# Patient Record
Sex: FEMALE | Race: WHITE | NOT HISPANIC OR LATINO | Employment: FULL TIME | URBAN - METROPOLITAN AREA
[De-identification: names, ages, dates, MRNs, and addresses within clinical notes are randomized per-mention and may not be internally consistent; named-entity substitution may affect disease eponyms.]

---

## 2017-01-09 ENCOUNTER — TRANSCRIBE ORDERS (OUTPATIENT)
Dept: ADMINISTRATIVE | Facility: HOSPITAL | Age: 47
End: 2017-01-09

## 2017-01-09 DIAGNOSIS — Z12.31 SCREENING MAMMOGRAM FOR HIGH-RISK PATIENT: Primary | ICD-10-CM

## 2017-02-06 ENCOUNTER — HOSPITAL ENCOUNTER (OUTPATIENT)
Dept: MAMMOGRAPHY | Facility: HOSPITAL | Age: 47
Discharge: HOME | End: 2017-02-06
Payer: COMMERCIAL

## 2017-02-06 DIAGNOSIS — Z12.31 SCREENING MAMMOGRAM FOR HIGH-RISK PATIENT: ICD-10-CM

## 2017-02-06 PROCEDURE — G0202 SCR MAMMO BI INCL CAD: HCPCS

## 2018-01-29 ENCOUNTER — TRANSCRIBE ORDERS (OUTPATIENT)
Dept: ADMINISTRATIVE | Facility: HOSPITAL | Age: 48
End: 2018-01-29

## 2018-01-29 DIAGNOSIS — Z12.39 SCREENING BREAST EXAMINATION: Primary | ICD-10-CM

## 2018-03-19 ENCOUNTER — TRANSCRIBE ORDERS (OUTPATIENT)
Dept: ADMINISTRATIVE | Facility: HOSPITAL | Age: 48
End: 2018-03-19

## 2018-03-19 ENCOUNTER — HOSPITAL ENCOUNTER (OUTPATIENT)
Dept: MAMMOGRAPHY | Facility: HOSPITAL | Age: 48
Discharge: HOME | End: 2018-03-19
Payer: COMMERCIAL

## 2018-03-19 DIAGNOSIS — Z12.39 SCREENING BREAST EXAMINATION: ICD-10-CM

## 2018-03-19 PROCEDURE — 77067 SCR MAMMO BI INCL CAD: CPT

## 2018-03-26 ENCOUNTER — HOSPITAL ENCOUNTER (OUTPATIENT)
Dept: RADIOLOGY | Facility: HOSPITAL | Age: 48
Discharge: HOME | End: 2018-03-26
Payer: COMMERCIAL

## 2018-03-26 DIAGNOSIS — R92.8 ABNORMAL SCREENING MAMMOGRAM: ICD-10-CM

## 2018-03-26 PROCEDURE — 76642 ULTRASOUND BREAST LIMITED: CPT

## 2018-04-05 ENCOUNTER — HOSPITAL ENCOUNTER (OUTPATIENT)
Dept: RADIOLOGY | Facility: HOSPITAL | Age: 48
Discharge: HOME | End: 2018-04-05
Payer: COMMERCIAL

## 2018-04-05 ENCOUNTER — HOSPITAL ENCOUNTER (OUTPATIENT)
Dept: RADIOLOGY | Facility: HOSPITAL | Age: 48
Discharge: HOME | End: 2018-04-05

## 2018-04-05 ENCOUNTER — HOSPITAL ENCOUNTER (OUTPATIENT)
Dept: RADIOLOGY | Facility: HOSPITAL | Age: 48
Discharge: HOME | End: 2018-04-05
Admitting: RADIOLOGY
Payer: COMMERCIAL

## 2018-04-05 VITALS
HEART RATE: 62 BPM | RESPIRATION RATE: 16 BRPM | SYSTOLIC BLOOD PRESSURE: 103 MMHG | DIASTOLIC BLOOD PRESSURE: 63 MMHG | OXYGEN SATURATION: 100 %

## 2018-04-05 DIAGNOSIS — N63.10 BREAST MASS, RIGHT: ICD-10-CM

## 2018-04-05 PROCEDURE — 88305 TISSUE EXAM BY PATHOLOGIST: CPT | Performed by: PATHOLOGY

## 2018-04-05 PROCEDURE — 19083 BX BREAST 1ST LESION US IMAG: CPT

## 2019-10-15 ENCOUNTER — TRANSCRIBE ORDERS (OUTPATIENT)
Dept: ADMINISTRATIVE | Facility: HOSPITAL | Age: 49
End: 2019-10-15

## 2019-10-15 DIAGNOSIS — Z12.31 ENCOUNTER FOR SCREENING MAMMOGRAM FOR MALIGNANT NEOPLASM OF BREAST: Primary | ICD-10-CM

## 2019-11-09 ENCOUNTER — HOSPITAL ENCOUNTER (OUTPATIENT)
Dept: RADIOLOGY | Facility: HOSPITAL | Age: 49
Discharge: HOME | End: 2019-11-09
Payer: COMMERCIAL

## 2019-11-09 VITALS — WEIGHT: 125 LBS | HEIGHT: 65 IN | BODY MASS INDEX: 20.83 KG/M2

## 2019-11-09 DIAGNOSIS — Z12.31 ENCOUNTER FOR SCREENING MAMMOGRAM FOR MALIGNANT NEOPLASM OF BREAST: ICD-10-CM

## 2019-11-09 PROCEDURE — 77063 BREAST TOMOSYNTHESIS BI: CPT

## 2019-11-09 PROCEDURE — 77067 SCR MAMMO BI INCL CAD: CPT

## 2020-10-19 ENCOUNTER — TELEPHONE (OUTPATIENT)
Dept: FAMILY MEDICINE CLINIC | Facility: CLINIC | Age: 50
End: 2020-10-19

## 2020-11-02 ENCOUNTER — OFFICE VISIT (OUTPATIENT)
Dept: OBGYN CLINIC | Facility: CLINIC | Age: 50
End: 2020-11-02
Payer: COMMERCIAL

## 2020-11-02 VITALS
DIASTOLIC BLOOD PRESSURE: 62 MMHG | WEIGHT: 129 LBS | TEMPERATURE: 98 F | BODY MASS INDEX: 21.49 KG/M2 | HEIGHT: 65 IN | SYSTOLIC BLOOD PRESSURE: 114 MMHG

## 2020-11-02 DIAGNOSIS — Z11.51 SCREENING FOR HUMAN PAPILLOMAVIRUS: ICD-10-CM

## 2020-11-02 DIAGNOSIS — Z01.419 ROUTINE GYNECOLOGICAL EXAMINATION: Primary | ICD-10-CM

## 2020-11-02 DIAGNOSIS — R92.2 BREAST DENSITY: ICD-10-CM

## 2020-11-02 DIAGNOSIS — N64.4 BREAST PAIN: ICD-10-CM

## 2020-11-02 PROCEDURE — 87624 HPV HI-RISK TYP POOLED RSLT: CPT | Performed by: OBSTETRICS & GYNECOLOGY

## 2020-11-02 PROCEDURE — G0145 SCR C/V CYTO,THINLAYER,RESCR: HCPCS | Performed by: OBSTETRICS & GYNECOLOGY

## 2020-11-02 PROCEDURE — S0610 ANNUAL GYNECOLOGICAL EXAMINA: HCPCS | Performed by: OBSTETRICS & GYNECOLOGY

## 2020-11-04 LAB
HPV HR 12 DNA CVX QL NAA+PROBE: NEGATIVE
HPV16 DNA CVX QL NAA+PROBE: NEGATIVE
HPV18 DNA CVX QL NAA+PROBE: NEGATIVE

## 2020-11-09 LAB
LAB AP GYN PRIMARY INTERPRETATION: NORMAL
LAB AP LMP: NORMAL
Lab: NORMAL

## 2020-12-09 ENCOUNTER — HOSPITAL ENCOUNTER (OUTPATIENT)
Dept: RADIOLOGY | Facility: HOSPITAL | Age: 50
Discharge: HOME | End: 2020-12-09
Attending: OBSTETRICS & GYNECOLOGY
Payer: COMMERCIAL

## 2020-12-09 VITALS — HEIGHT: 65 IN | BODY MASS INDEX: 20.83 KG/M2 | WEIGHT: 125 LBS

## 2020-12-09 DIAGNOSIS — R92.2 BREAST DENSITY: ICD-10-CM

## 2020-12-09 DIAGNOSIS — N64.4 BREAST PAIN: ICD-10-CM

## 2020-12-09 PROCEDURE — G0279 TOMOSYNTHESIS, MAMMO: HCPCS

## 2020-12-09 PROCEDURE — 76642 ULTRASOUND BREAST LIMITED: CPT

## 2020-12-09 PROCEDURE — 77066 DX MAMMO INCL CAD BI: CPT

## 2020-12-14 DIAGNOSIS — N60.01 BENIGN BREAST CYST IN FEMALE, RIGHT: Primary | ICD-10-CM

## 2021-11-12 ENCOUNTER — OFFICE VISIT (OUTPATIENT)
Dept: FAMILY MEDICINE CLINIC | Facility: CLINIC | Age: 51
End: 2021-11-12
Payer: COMMERCIAL

## 2021-11-12 ENCOUNTER — NEW REFERRAL (OUTPATIENT)
Dept: URBAN - METROPOLITAN AREA CLINIC 6 | Facility: CLINIC | Age: 51
End: 2021-11-12

## 2021-11-12 VITALS
HEART RATE: 90 BPM | HEIGHT: 65 IN | DIASTOLIC BLOOD PRESSURE: 80 MMHG | RESPIRATION RATE: 14 BRPM | OXYGEN SATURATION: 99 % | WEIGHT: 130.8 LBS | TEMPERATURE: 97.1 F | BODY MASS INDEX: 21.79 KG/M2 | SYSTOLIC BLOOD PRESSURE: 110 MMHG

## 2021-11-12 DIAGNOSIS — H57.12 ACUTE PAIN IN LEFT EYE: Primary | ICD-10-CM

## 2021-11-12 DIAGNOSIS — H57.89 EYE REDNESS: ICD-10-CM

## 2021-11-12 DIAGNOSIS — H57.89: ICD-10-CM

## 2021-11-12 PROCEDURE — G8427 DOCREV CUR MEDS BY ELIG CLIN: HCPCS

## 2021-11-12 PROCEDURE — 3725F SCREEN DEPRESSION PERFORMED: CPT | Performed by: FAMILY MEDICINE

## 2021-11-12 PROCEDURE — 1036F TOBACCO NON-USER: CPT | Performed by: FAMILY MEDICINE

## 2021-11-12 PROCEDURE — 99213 OFFICE O/P EST LOW 20 MIN: CPT | Performed by: FAMILY MEDICINE

## 2021-11-12 PROCEDURE — 1036F TOBACCO NON-USER: CPT

## 2021-11-12 PROCEDURE — 99243 OFF/OP CNSLTJ NEW/EST LOW 30: CPT

## 2021-11-12 PROCEDURE — 3008F BODY MASS INDEX DOCD: CPT | Performed by: FAMILY MEDICINE

## 2021-11-12 ASSESSMENT — VISUAL ACUITY
OD_CC: 20/25
OS_CC: 20/25

## 2021-11-12 ASSESSMENT — TONOMETRY
OS_IOP_MMHG: 12
OD_IOP_MMHG: 14

## 2022-01-03 ENCOUNTER — ANNUAL EXAM (OUTPATIENT)
Dept: OBGYN CLINIC | Facility: CLINIC | Age: 52
End: 2022-01-03
Payer: COMMERCIAL

## 2022-01-03 ENCOUNTER — HOSPITAL ENCOUNTER (OUTPATIENT)
Dept: RADIOLOGY | Facility: HOSPITAL | Age: 52
Discharge: HOME | End: 2022-01-03
Attending: OBSTETRICS & GYNECOLOGY
Payer: COMMERCIAL

## 2022-01-03 VITALS
WEIGHT: 131.2 LBS | BODY MASS INDEX: 21.86 KG/M2 | HEIGHT: 65 IN | SYSTOLIC BLOOD PRESSURE: 114 MMHG | DIASTOLIC BLOOD PRESSURE: 70 MMHG

## 2022-01-03 VITALS — WEIGHT: 125 LBS | BODY MASS INDEX: 20.83 KG/M2 | HEIGHT: 65 IN

## 2022-01-03 DIAGNOSIS — Z12.31 ENCOUNTER FOR SCREENING MAMMOGRAM FOR MALIGNANT NEOPLASM OF BREAST: ICD-10-CM

## 2022-01-03 DIAGNOSIS — Z01.419 ENCOUNTER FOR GYNECOLOGICAL EXAMINATION WITHOUT ABNORMAL FINDING: Primary | ICD-10-CM

## 2022-01-03 PROCEDURE — G0145 SCR C/V CYTO,THINLAYER,RESCR: HCPCS | Performed by: PHYSICIAN ASSISTANT

## 2022-01-03 PROCEDURE — G0476 HPV COMBO ASSAY CA SCREEN: HCPCS | Performed by: PHYSICIAN ASSISTANT

## 2022-01-03 PROCEDURE — 77067 SCR MAMMO BI INCL CAD: CPT

## 2022-01-03 PROCEDURE — S0612 ANNUAL GYNECOLOGICAL EXAMINA: HCPCS | Performed by: PHYSICIAN ASSISTANT

## 2022-01-03 PROCEDURE — 77063 BREAST TOMOSYNTHESIS BI: CPT

## 2022-01-03 NOTE — PROGRESS NOTES
Assessment/Plan:    No problem-specific Assessment & Plan notes found for this encounter  Diagnoses and all orders for this visit:    Encounter for gynecological examination without abnormal finding  -     Liquid-based pap, screening        Pap done  Call if periods worsen or change  If no problems, patient to return in 1 year for routine gyn care  Subjective:      Patient ID: Helmut Lanes is a 54 y o  female  Patient is here for yearly gyn exam   States she is doing well overall  Periods are still mostly regular once a month; skips an occasional cycle  Bleeding lasts for 5-6 days  She denies heavy bleeding, severe cramping, HA, and mood symptoms  Patient also denies bowel/bladder changes, pelvic pain, bloating, abdominal pain, n/v, change in appetite, and thyroid disease  Patient had mammogram this morning  She is performing self-breast exam   Denies new masses, skin changes, nipple discharge, and pain/tenderness  The following portions of the patient's history were reviewed and updated as appropriate: allergies, current medications, past family history, past medical history, past social history, past surgical history and problem list     Review of Systems   Constitutional: Negative for appetite change and unexpected weight change  Cardiovascular:        No masses, skin changes, nipple discharge, and pain/tenderness  Gastrointestinal: Negative for abdominal distention, abdominal pain, constipation, diarrhea, nausea and vomiting  Genitourinary: Negative for difficulty urinating, dysuria, frequency, genital sores, hematuria, menstrual problem, pelvic pain, urgency, vaginal bleeding, vaginal discharge and vaginal pain  Objective:      /70 (BP Location: Left arm, Patient Position: Sitting, Cuff Size: Standard)   Ht 5' 5" (1 651 m)   Wt 59 5 kg (131 lb 3 2 oz)   LMP 12/16/2021 (LMP Unknown)   BMI 21 83 kg/m²          Physical Exam  Vitals reviewed   Exam conducted with a chaperone present  Constitutional:       Appearance: Normal appearance  She is well-developed and normal weight  Neck:      Thyroid: No thyromegaly  Pulmonary:      Effort: Pulmonary effort is normal    Chest:   Breasts: Breasts are symmetrical       Right: Normal  No swelling, bleeding, inverted nipple, mass, nipple discharge, skin change, tenderness, axillary adenopathy or supraclavicular adenopathy  Left: Normal  No swelling, bleeding, inverted nipple, mass, nipple discharge, skin change, tenderness, axillary adenopathy or supraclavicular adenopathy  Abdominal:      General: Abdomen is flat  There is no distension  Palpations: Abdomen is soft  Tenderness: There is no abdominal tenderness  Genitourinary:     General: Normal vulva  Pubic Area: No rash  Labia:         Right: No rash, tenderness, lesion or injury  Left: No rash, tenderness, lesion or injury  Vagina: Normal  No vaginal discharge, erythema, tenderness or bleeding  Cervix: Normal       Uterus: Normal        Adnexa: Right adnexa normal and left adnexa normal         Right: No mass, tenderness or fullness  Left: No mass, tenderness or fullness  Musculoskeletal:      Cervical back: Neck supple  Lymphadenopathy:      Cervical: No cervical adenopathy  Upper Body:      Right upper body: No supraclavicular or axillary adenopathy  Left upper body: No supraclavicular or axillary adenopathy  Lower Body: No right inguinal adenopathy  No left inguinal adenopathy  Skin:     General: Skin is warm and dry  Neurological:      Mental Status: She is alert and oriented to person, place, and time  Psychiatric:         Mood and Affect: Mood normal          Behavior: Behavior normal  Behavior is cooperative  Thought Content:  Thought content normal          Judgment: Judgment normal

## 2022-01-11 LAB
LAB AP GYN PRIMARY INTERPRETATION: NORMAL
Lab: NORMAL

## 2022-03-16 ENCOUNTER — RA CDI HCC (OUTPATIENT)
Dept: OTHER | Facility: HOSPITAL | Age: 52
End: 2022-03-16

## 2022-03-16 NOTE — PROGRESS NOTES
[facility]  coding opportunities       Chart reviewed, no opportunity found: CHART REVIEWED, NO OPPORTUNITY FOUND        Patients Insurance        Commercial Insurance: [address] [place] [address]

## 2022-03-21 ENCOUNTER — OFFICE VISIT (OUTPATIENT)
Dept: FAMILY MEDICINE CLINIC | Facility: CLINIC | Age: 52
End: 2022-03-21
Payer: COMMERCIAL

## 2022-03-21 VITALS
DIASTOLIC BLOOD PRESSURE: 68 MMHG | HEART RATE: 66 BPM | OXYGEN SATURATION: 98 % | HEIGHT: 65 IN | RESPIRATION RATE: 16 BRPM | SYSTOLIC BLOOD PRESSURE: 100 MMHG | TEMPERATURE: 97.6 F | BODY MASS INDEX: 21.83 KG/M2 | WEIGHT: 131 LBS

## 2022-03-21 DIAGNOSIS — Z11.59 NEED FOR HEPATITIS C SCREENING TEST: ICD-10-CM

## 2022-03-21 DIAGNOSIS — Z23 IMMUNIZATION DUE: ICD-10-CM

## 2022-03-21 DIAGNOSIS — F51.01 PRIMARY INSOMNIA: ICD-10-CM

## 2022-03-21 DIAGNOSIS — Z00.00 ANNUAL PHYSICAL EXAM: Primary | ICD-10-CM

## 2022-03-21 PROCEDURE — 99396 PREV VISIT EST AGE 40-64: CPT | Performed by: FAMILY MEDICINE

## 2022-03-21 PROCEDURE — 90471 IMMUNIZATION ADMIN: CPT | Performed by: FAMILY MEDICINE

## 2022-03-21 PROCEDURE — 3008F BODY MASS INDEX DOCD: CPT | Performed by: FAMILY MEDICINE

## 2022-03-21 PROCEDURE — 90715 TDAP VACCINE 7 YRS/> IM: CPT | Performed by: FAMILY MEDICINE

## 2022-03-21 PROCEDURE — 3725F SCREEN DEPRESSION PERFORMED: CPT | Performed by: FAMILY MEDICINE

## 2022-03-21 PROCEDURE — 1036F TOBACCO NON-USER: CPT | Performed by: FAMILY MEDICINE

## 2022-03-21 NOTE — PROGRESS NOTES
Assessment/Plan:         Problem List Items Addressed This Visit        Other    Annual physical exam - Primary     Check routine labs           Relevant Orders    CBC and differential    Comprehensive metabolic panel    Lipid panel    Urinalysis with microscopic    Ambulatory referral for colonoscopy    Primary insomnia     Declines meds            Other Visit Diagnoses     Need for hepatitis C screening test        Relevant Orders    Hepatitis C Antibody ([facility])            Subjective: pt here for physical sees gyn mammo up to date     Patient ID: Poli Lambert is a 50 y o  female  HPI    The following portions of the patient's history were reviewed and updated as appropriate:   Past Medical History:  She has a past medical history of Breast mass, right and Bulging lumbar disc ,  _______________________________________________________________________  Medical Problems:  does not have any pertinent problems on file ,  _______________________________________________________________________  Past Surgical History:   has a past surgical history that includes US guided breast biopsy right complete (Right, 2018); Breast biopsy (Right); Mammo (historical) (2016); Back surgery; Tonsillectomy;  section; and DILATION AND CURETTAGE OF UTERUS WITH HYSTEROSOCPY  ,  _______________________________________________________________________  Family History:  family history includes Breast cancer in her family; Cancer in her maternal grandmother; Diabetes in her maternal grandfather, maternal grandmother, mother, and paternal grandfather; Heart attack in her father; Heart disease in her father; Hyperlipidemia in her father; Hypertension in her father; Leukemia in her maternal grandmother; No Known Problems in her maternal aunt, paternal aunt, sister, son, and son ,  _______________________________________________________________________  Social History:   reports that she has never smoked   She has never used smokeless tobacco  She reports current alcohol use  She reports that she does not use drugs  ,  _______________________________________________________________________  Allergies:  has No Known Allergies     _______________________________________________________________________  No current outpatient medications on file  No current facility-administered medications for this visit      _______________________________________________________________________  Review of Systems   Constitutional: Negative for appetite change, chills, fatigue and fever  Respiratory: Negative for cough, chest tightness and shortness of breath  Cardiovascular: Negative for chest pain, palpitations and leg swelling  Gastrointestinal: Negative for abdominal pain, constipation, diarrhea, nausea and vomiting  Genitourinary: Negative for difficulty urinating and frequency  Musculoskeletal: Negative for arthralgias, back pain and neck pain  Skin: Negative for rash  Neurological: Negative for dizziness, weakness, light-headedness, numbness and headaches  Hematological: Does not bruise/bleed easily  Psychiatric/Behavioral: Positive for sleep disturbance  Negative for dysphoric mood  The patient is not nervous/anxious  Objective:  Vitals:    03/21/22 1040   BP: 100/68   BP Location: Left arm   Patient Position: Sitting   Cuff Size: Standard   Pulse: 66   Resp: 16   Temp: 97 6 °F (36 4 °C)   TempSrc: Temporal   SpO2: 98%   Weight: 59 4 kg (131 lb)   Height: 5' 5" (1 651 m)     Body mass index is 21 8 kg/m²  Physical Exam  Vitals reviewed  Constitutional:       General: She is not in acute distress  Appearance: Normal appearance  She is well-developed  She is not ill-appearing  HENT:      Head: Normocephalic        Right Ear: Tympanic membrane, ear canal and external ear normal       Left Ear: Tympanic membrane, ear canal and external ear normal       Nose: Nose normal       Mouth/Throat: Mouth: Mucous membranes are moist       Pharynx: No oropharyngeal exudate  Eyes:      General: Lids are normal  No scleral icterus  Extraocular Movements: Extraocular movements intact  Conjunctiva/sclera: Conjunctivae normal       Pupils: Pupils are equal, round, and reactive to light  Neck:      Thyroid: No thyromegaly  Vascular: No carotid bruit  Cardiovascular:      Rate and Rhythm: Normal rate and regular rhythm  Pulses: Normal pulses  Heart sounds: Normal heart sounds  No murmur heard  No friction rub  Pulmonary:      Effort: Pulmonary effort is normal       Breath sounds: Normal breath sounds  No wheezing, rhonchi or rales  Abdominal:      General: Bowel sounds are normal  There is no distension  Palpations: Abdomen is soft  There is no mass  Tenderness: There is no abdominal tenderness  There is no guarding  Hernia: No hernia is present  Musculoskeletal:         General: Normal range of motion  Cervical back: Normal range of motion and neck supple  Lymphadenopathy:      Cervical: No cervical adenopathy  Skin:     General: Skin is warm and dry  Findings: No rash  Comments: No abnormal appearing moles   Neurological:      General: No focal deficit present  Mental Status: She is alert and oriented to person, place, and time  Mental status is at baseline  Cranial Nerves: No cranial nerve deficit  Sensory: No sensory deficit  Motor: No weakness, tremor or abnormal muscle tone        Coordination: Coordination normal       Gait: Gait normal       Deep Tendon Reflexes: Reflexes normal    Psychiatric:         Mood and Affect: Mood normal          Speech: Speech normal          Behavior: Behavior normal

## 2022-03-22 LAB
ALBUMIN SERPL-MCNC: 4.5 G/DL (ref 3.6–5.1)
ALBUMIN/GLOB SERPL: 1.6 (CALC) (ref 1–2.5)
ALP SERPL-CCNC: 50 U/L (ref 37–153)
ALT SERPL-CCNC: 14 U/L (ref 6–29)
APPEARANCE UR: CLEAR
AST SERPL-CCNC: 19 U/L (ref 10–35)
BACTERIA UR QL AUTO: NORMAL /HPF
BASOPHILS # BLD AUTO: 58 CELLS/UL (ref 0–200)
BASOPHILS NFR BLD AUTO: 0.9 %
BILIRUB SERPL-MCNC: 0.5 MG/DL (ref 0.2–1.2)
BILIRUB UR QL STRIP: NEGATIVE
BUN SERPL-MCNC: 12 MG/DL (ref 7–25)
BUN/CREAT SERPL: NORMAL (CALC) (ref 6–22)
CALCIUM SERPL-MCNC: 9.8 MG/DL (ref 8.6–10.4)
CHLORIDE SERPL-SCNC: 103 MMOL/L (ref 98–110)
CHOLEST SERPL-MCNC: 207 MG/DL
CHOLEST/HDLC SERPL: 4.1 (CALC)
CO2 SERPL-SCNC: 30 MMOL/L (ref 20–32)
COLOR UR: YELLOW
CREAT SERPL-MCNC: 0.75 MG/DL (ref 0.5–1.05)
EOSINOPHIL # BLD AUTO: 141 CELLS/UL (ref 15–500)
EOSINOPHIL NFR BLD AUTO: 2.2 %
ERYTHROCYTE [DISTWIDTH] IN BLOOD BY AUTOMATED COUNT: 12.8 % (ref 11–15)
GLOBULIN SER CALC-MCNC: 2.9 G/DL (CALC) (ref 1.9–3.7)
GLUCOSE SERPL-MCNC: 80 MG/DL (ref 65–99)
GLUCOSE UR QL STRIP: NEGATIVE
HCT VFR BLD AUTO: 37.9 % (ref 35–45)
HCV AB S/CO SERPL IA: 0.07
HCV AB SERPL QL IA: NORMAL
HDLC SERPL-MCNC: 50 MG/DL
HGB BLD-MCNC: 12.9 G/DL (ref 11.7–15.5)
HGB UR QL STRIP: NEGATIVE
HYALINE CASTS #/AREA URNS LPF: NORMAL /LPF
KETONES UR QL STRIP: NEGATIVE
LDLC SERPL CALC-MCNC: 135 MG/DL (CALC)
LEUKOCYTE ESTERASE UR QL STRIP: NEGATIVE
LYMPHOCYTES # BLD AUTO: 1709 CELLS/UL (ref 850–3900)
LYMPHOCYTES NFR BLD AUTO: 26.7 %
MCH RBC QN AUTO: 31.3 PG (ref 27–33)
MCHC RBC AUTO-ENTMCNC: 34 G/DL (ref 32–36)
MCV RBC AUTO: 92 FL (ref 80–100)
MONOCYTES # BLD AUTO: 410 CELLS/UL (ref 200–950)
MONOCYTES NFR BLD AUTO: 6.4 %
NEUTROPHILS # BLD AUTO: 4083 CELLS/UL (ref 1500–7800)
NEUTROPHILS NFR BLD AUTO: 63.8 %
NITRITE UR QL STRIP: NEGATIVE
NONHDLC SERPL-MCNC: 157 MG/DL (CALC)
PH UR STRIP: 8 [PH] (ref 5–8)
PLATELET # BLD AUTO: 242 THOUSAND/UL (ref 140–400)
PMV BLD REES-ECKER: 12.4 FL (ref 7.5–12.5)
POTASSIUM SERPL-SCNC: 4.2 MMOL/L (ref 3.5–5.3)
PROT SERPL-MCNC: 7.4 G/DL (ref 6.1–8.1)
PROT UR QL STRIP: NEGATIVE
RBC # BLD AUTO: 4.12 MILLION/UL (ref 3.8–5.1)
RBC #/AREA URNS HPF: NORMAL /HPF
SL AMB EGFR AFRICAN AMERICAN: 108 ML/MIN/1.73M2
SL AMB EGFR NON AFRICAN AMERICAN: 93 ML/MIN/1.73M2
SODIUM SERPL-SCNC: 140 MMOL/L (ref 135–146)
SP GR UR STRIP: 1.02 (ref 1–1.03)
SQUAMOUS #/AREA URNS HPF: NORMAL /HPF
TRIGL SERPL-MCNC: 110 MG/DL
WBC # BLD AUTO: 6.4 THOUSAND/UL (ref 3.8–10.8)
WBC #/AREA URNS HPF: NORMAL /HPF

## 2022-03-23 DIAGNOSIS — E78.5 HYPERLIPIDEMIA, UNSPECIFIED HYPERLIPIDEMIA TYPE: Primary | ICD-10-CM

## 2022-05-10 ENCOUNTER — TELEPHONE (OUTPATIENT)
Dept: GASTROENTEROLOGY | Facility: CLINIC | Age: 52
End: 2022-05-10

## 2022-05-10 NOTE — TELEPHONE ENCOUNTER
Patients GI provider: Passed OA    Number to return call: ([phone])    Reason for call: Pt calling returning Ramya's call trying to schedule her colonoscopy  Please call back  Thank you!     Scheduled procedure/appointment date if applicable: Apt/procedure

## 2022-05-16 NOTE — TELEPHONE ENCOUNTER
Spoke to patient and scheduled OA colon with Dr Sanju Griffin @ [facility] on 08/22/2022  Went over prep instructions per protocol and mailed to patient as well  Scheduled date of OA Colon (as of today): 08/22/2022  Physician performing: Dr Sanju Griffin  Location of procedure: [address]  Bowel prep reviewed with patient: Miralax/Mag Citrate  Instructions reviewed with patient by:  Amena  Clearances: n/a

## 2022-10-03 ENCOUNTER — PROBLEM (OUTPATIENT)
Dept: URBAN - METROPOLITAN AREA CLINIC 6 | Facility: CLINIC | Age: 52
End: 2022-10-03

## 2022-10-03 DIAGNOSIS — H15.102: ICD-10-CM

## 2022-10-03 DIAGNOSIS — H15.012: ICD-10-CM

## 2022-10-03 DIAGNOSIS — H25.13: ICD-10-CM

## 2022-10-03 LAB
SCLERITIS WORKUP: NORMAL

## 2022-10-03 PROCEDURE — 92014 COMPRE OPH EXAM EST PT 1/>: CPT

## 2022-10-03 ASSESSMENT — TONOMETRY
OS_IOP_MMHG: 14
OD_IOP_MMHG: 17

## 2022-10-03 ASSESSMENT — VISUAL ACUITY
OD_CC: 20/30
OS_CC: 20/25
OU_SC: J1+

## 2022-10-13 ENCOUNTER — TELEPHONE (OUTPATIENT)
Dept: FAMILY MEDICINE CLINIC | Facility: CLINIC | Age: 52
End: 2022-10-13

## 2022-10-13 ENCOUNTER — OFFICE VISIT (OUTPATIENT)
Dept: FAMILY MEDICINE CLINIC | Facility: CLINIC | Age: 52
End: 2022-10-13
Payer: COMMERCIAL

## 2022-10-13 VITALS
SYSTOLIC BLOOD PRESSURE: 112 MMHG | WEIGHT: 133 LBS | TEMPERATURE: 97.2 F | BODY MASS INDEX: 22.16 KG/M2 | OXYGEN SATURATION: 97 % | RESPIRATION RATE: 16 BRPM | DIASTOLIC BLOOD PRESSURE: 72 MMHG | HEART RATE: 82 BPM | HEIGHT: 65 IN

## 2022-10-13 DIAGNOSIS — R76.8 ELEVATED ANTINUCLEAR ANTIBODY (ANA) LEVEL: Primary | ICD-10-CM

## 2022-10-13 DIAGNOSIS — G44.86 CERVICOGENIC HEADACHE: ICD-10-CM

## 2022-10-13 PROCEDURE — 99214 OFFICE O/P EST MOD 30 MIN: CPT | Performed by: FAMILY MEDICINE

## 2022-10-13 NOTE — PROGRESS NOTES
Name: Cole Hammond      : 1970      MRN: [id number]  Encounter Provider: Garrett Kern MD  Encounter Date: 10/13/2022   Encounter department: [address] [place] [address] MEDICINE    Assessment & Plan     1  Elevated antinuclear antibody (BRITANY) level  Assessment & Plan: Will send to rheumatologist    Orders:  -     Ambulatory Referral to Rheumatology; Future    2  Cervicogenic headache  Assessment & Plan:  Mild headache likely  Musculoskeletal nl neuro exam             Subjective      HPI pt had labs done by ophthalmologist since has had recurrent scleritis BRITANY  Was borderline positive  Wants to see specialist  has h ad  low level headache on off  back of head at base of skull  Review of Systems   Constitutional: Negative for fever  Respiratory: Negative for cough, chest tightness and shortness of breath  Cardiovascular: Negative for chest pain, palpitations and leg swelling  Musculoskeletal: Negative for joint swelling and myalgias  Skin: Negative for rash  Neurological: Positive for headaches (low level 1-2 intensity on off  for more than 1 yr  not enough to treat )  Negative for dizziness, weakness and light-headedness  Psychiatric/Behavioral: Negative for dysphoric mood  The patient is not nervous/anxious  No current outpatient medications on file prior to visit  Objective     /72 (BP Location: Left arm, Patient Position: Sitting, Cuff Size: Standard)   Pulse 82   Temp (!) 97 2 °F (36 2 °C) (Temporal)   Resp 16   Ht 5' 5" (1 651 m)   Wt 60 3 kg (133 lb)   SpO2 97%   BMI 22 13 kg/m²     Physical Exam  Constitutional:       General: She is not in acute distress  Appearance: Normal appearance  She is well-developed  She is obese  She is not ill-appearing  Eyes:      Extraocular Movements: Extraocular movements intact  Pupils: Pupils are equal, round, and reactive to light  Cardiovascular:      Rate and Rhythm: Normal rate and regular rhythm  Pulses: Normal pulses  Heart sounds: Normal heart sounds  No murmur heard  Pulmonary:      Effort: Pulmonary effort is normal       Breath sounds: Normal breath sounds  Musculoskeletal:         General: Tenderness (only minimal bilateral trapezius tenderness neck FROM) present  Right lower leg: No edema  Left lower leg: No edema  Neurological:      General: No focal deficit present  Mental Status: She is alert and oriented to person, place, and time  Mental status is at baseline  Cranial Nerves: No cranial nerve deficit  Sensory: No sensory deficit  Motor: Weakness present  Coordination: Coordination normal       Gait: Gait normal       Deep Tendon Reflexes: Reflexes normal    Psychiatric:         Mood and Affect: Mood normal          Behavior: Behavior normal          Thought Content:  Thought content normal        Romeo De Guzman MD

## 2022-10-13 NOTE — TELEPHONE ENCOUNTER
Patient had bloodwork done on October 3 at [facility], [address] and there were some findings (dr Juan José Barker ordered the bloodwork)  And was told her pcp should go over the results with her  WE do not have a copy of the results yet  Patient is coming in today to go over the results with Dr Russ Jackson  Patient does have the results on her phone      Phone # [phone]

## 2022-10-25 NOTE — PROGRESS NOTES
Assessment and Plan:   Ms Gino Terrazas is a 53-year-old female with history significant for recurrent, bilateral scleritis/episcleritis who presents for an evaluation of a positive BRITANY 1:80 nuclear, homogeneous pattern  She is referred by Dr Triston Gutierrez for a rheumatology consult  Dirk Killian presents today for an evaluation of a positive BRITANY that was detected due to recurrent episodes of bilateral episcleritis/scleritis that has been occurring over the past 1-1/2 years  Other than this complaint her review of systems is unrevealing and she does not offer additional history that would be suggestive of an underlying connective tissue disease  I suspect most likely the BRITANY may be a false-positive but to further evaluate it I will complete the testing as listed below and determine based on this if the BRITANY may be clinically significant  In addition, scleritis is uncommon to occur with lupus like conditions     - Secondly, I would like to obtain records from her ophthalmologist to determine which condition she has been diagnosed with as her symptoms of mild pain with spontaneous improvement appears less descriptive of scleritis  I discussed with her that treatment of episcleritis and scleritis would differ significantly and hence it is important to have an accurate diagnosis  If there are truly concerns for recurrent scleritis then systemic immunosuppression may be a consideration in the future  For now she will continue follow up with Ophthalmology for management of the steroid eye drops  Plan:  Diagnoses and all orders for this visit:    Elevated antinuclear antibody (BRITANY) level  -     Anti-DNA antibody, double-stranded; Future  -     Anti-Luz Maria 1 Antibody; Future  -     Anti-scleroderma antibody; Future  -     Centromere Antibody; Future  -     Sjogren's Antibodies; Future  -     Nuclear antigen antibody; Future  -     C4 complement; Future  -     C3 complement; Future  -     Beta-2 glycoprotein antibodies;  Future  - Cardiolipin antibody; Future  -     Lupus anticoagulant; Future  -     Urinalysis with microscopic  -     Protein / creatinine ratio, urine  -     Cyclic citrul peptide antibody, IgG; Future  -     CK; Future  -     Ferritin; Future  -     Thyroid Antibodies Panel; Future  -     Anti-neutrophilic cytoplasmic antibody; Future  -     Ambulatory Referral to Rheumatology    History of scleritis  -     Anti-DNA antibody, double-stranded; Future  -     Anti-Luz Maria 1 Antibody; Future  -     Anti-scleroderma antibody; Future  -     Centromere Antibody; Future  -     Sjogren's Antibodies; Future  -     Nuclear antigen antibody; Future  -     C4 complement; Future  -     C3 complement; Future  -     Beta-2 glycoprotein antibodies; Future  -     Cardiolipin antibody; Future  -     Lupus anticoagulant; Future  -     Urinalysis with microscopic  -     Protein / creatinine ratio, urine  -     Cyclic citrul peptide antibody, IgG; Future  -     CK; Future  -     Ferritin; Future  -     Thyroid Antibodies Panel; Future  -     Anti-neutrophilic cytoplasmic antibody; Future    Other orders  -     prednisoLONE acetate (PRED FORTE) 1 % ophthalmic suspension      Activities as tolerated  Continue other medications as prescribed by PCP and other specialists  RTC in 6 months  HPI  Ms Reyes Shah is a 70-year-old female with history significant for recurrent, bilateral scleritis/episcleritis who presents for an evaluation of a positive BRITANY 1:80 nuclear, homogeneous pattern  She is referred by Dr Joan Lovett for a rheumatology consult  Patient reports over the past 1 5 years she started to experience recurrent complaints of redness with mild eye pain noted with eye movement  There has been no significant eye pain  No additional symptoms such as blurred vision, photophobia or eye discharge  These symptoms have affected both eyes but not at the same time  She has had a few episodes where her symptoms have resolved spontaneously  Due to the recurrent nature she established with [facility] and was diagnosed with scleritis (uncertain if it may actually be a diagnosis of episcleritis)  Her most recent flare-up was 1 month ago at which time she was started on Pred Forte eye drops which she is currently on and has a follow-up appointment on Monday  This is her second course of steroid eyedrops  The steroids help significantly  In view of the recurrent nature of her symptoms she had an autoimmune screen done which showed the positive BRITANY  A rheumatoid factor, ESR, CRP, Lyme antibody profile, QuantiFERON TB gold, RPR, HLA B27 antigen, angiotensin-converting enzyme and CBC were normal     She denies fevers, unintentional weight loss, alopecia, dry eyes, dry mouth, skin rash, psoriasis, photosensitivity, skin thickening/tightening, mouth/nose ulcers (except for mouth ulcers with known triggers), recurrent sinus disease, swollen glands, pleuritic chest pain, shortness of breath, cough, hemoptysis, inflammatory bowel disease, blood clots, miscarriages, Raynaud's, muscle weakness or joint pain/swelling/stiffness  She reports a history of rheumatoid arthritis in her maternal grandmother  The following portions of the patient's history were reviewed and updated as appropriate: allergies, current medications, past family history, past medical history, past social history, past surgical history and problem list       Review of Systems  Constitutional: Negative for weight change, fevers, chills, night sweats  Positive for fatigue  ENT/Mouth: Negative for hearing changes, ear pain, nasal congestion, sinus pain, hoarseness, sore throat, rhinorrhea, swallowing difficulty  Eyes: Negative for pain, redness, discharge, vision changes currently  Cardiovascular: Negative for chest pain, SOB, palpitations  Respiratory: Negative for cough, sputum, wheezing, dyspnea     Gastrointestinal: Negative for nausea, vomiting, diarrhea, constipation, pain, heartburn  Genitourinary: Negative for dysuria, urinary frequency, hematuria  Musculoskeletal: As per HPI  Skin: Negative for skin rash, color changes  Neuro: Negative for weakness, numbness, tingling, loss of consciousness  Psych: Negative for anxiety, depression  Heme/Lymph: Negative for easy bruising, bleeding, lymphadenopathy  Past Medical History:   Diagnosis Date   • Breast mass, right     benign   • Bulging lumbar disc    • Visual impairment     Scleritis       Past Surgical History:   Procedure Laterality Date   • BACK SURGERY     • BREAST BIOPSY Right     benign   •  SECTION     • DILATION AND CURETTAGE OF UTERUS WITH HYSTEROSOCPY     • MAMMO (HISTORICAL)  2016    scheduled for mammogram in december   •  [email or web address]? • TONSILLECTOMY     • US GUIDED BREAST BIOPSY RIGHT COMPLETE Right 2018       Social History     Socioeconomic History   • Marital status: /Civil Union     Spouse name: Not on file   • Number of children: Not on file   • Years of education: Not on file   • Highest education level: Not on file   Occupational History   • Not on file   Tobacco Use   • Smoking status: Never Smoker   • Smokeless tobacco: Never Used   Vaping Use   • Vaping Use: Never used   Substance and Sexual Activity   • Alcohol use:  Yes     Alcohol/week: 1 0 standard drink     Types: 1 Standard drinks or equivalent per week     Comment: Occasionally   • Drug use: Never   • Sexual activity: Yes     Partners: Male     Birth control/protection: Male Sterilization     Comment: Partner vasectomy   Other Topics Concern   • Not on file   Social History Narrative    · Do you currently or have you served in the [facility] [facility]:   No      · Were you activated, into active duty, as a member of the [employer] or as a Reservist:   No      · 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      · Education:   Post Graduate      · Sexual orientation: Heterosexual      · Exercise level: Moderate      · Diet:   Vegetarian      · General stress level:   Low        · Caffeine intake: Moderate      · Seat belts used routinely:   Yes      · Sunscreen used routinely:   Yes      · Live alone or with others:   with others      · Presence of domestic violence:   No      · Do you feel safe at home:   Yes         Per abbe      Social Determinants of Health     Financial Resource Strain: Not on file   Food Insecurity: Not on file   Transportation Needs: Not on file   Physical Activity: Not on file   Stress: Not on file   Social Connections: Not on file   Intimate Partner Violence: Not on file   Housing Stability: Not on file       Family History   Problem Relation Age of Onset   • Diabetes Mother    • Hyperlipidemia Father    • Hypertension Father    • Heart disease Father    • Heart attack Father    • No Known Problems Sister    • No Known Problems Son    • No Known Problems Son    • Leukemia Maternal Grandmother         79   • Diabetes Maternal Grandmother    • Cancer Maternal Grandmother         leukemia   • Arthritis Maternal Grandmother    • Rheum arthritis Maternal Grandmother    • Diabetes Maternal Grandfather    • Diabetes Paternal Grandfather    • No Known Problems Maternal Aunt    • No Known Problems Paternal Aunt    • Breast cancer Family         unkown age       No Known Allergies      Current Outpatient Medications:   •  prednisoLONE acetate (PRED FORTE) 1 % ophthalmic suspension, , Disp: , Rfl:       Objective:    Vitals:    10/26/22 1146   BP: 110/70   Pulse: 71   Weight: 59 kg (130 lb)       Physical Exam  General: Well appearing, well nourished, in no distress  Oriented x 3, normal mood and affect  Ambulating without difficulty  Skin: Good turgor, no rash, unusual bruising or prominent lesions  Hair: Normal texture and distribution  Nails: Normal color, no deformities  HEENT:  Head: Normocephalic, atraumatic    Eyes: Conjunctiva clear, sclera non-icteric, EOM intact  Extremities: No amputations or deformities, cyanosis, edema  Musculoskeletal: No swelling visualized  Neurologic: Alert and oriented  No focal neurological deficits appreciated  Psychiatric: Normal mood and affect  SARAVANAN Hernandez    Rheumatology

## 2022-10-26 ENCOUNTER — CONSULT (OUTPATIENT)
Dept: RHEUMATOLOGY | Facility: CLINIC | Age: 52
End: 2022-10-26
Payer: COMMERCIAL

## 2022-10-26 ENCOUNTER — TELEPHONE (OUTPATIENT)
Dept: RHEUMATOLOGY | Facility: CLINIC | Age: 52
End: 2022-10-26

## 2022-10-26 VITALS
BODY MASS INDEX: 21.63 KG/M2 | HEART RATE: 71 BPM | DIASTOLIC BLOOD PRESSURE: 70 MMHG | SYSTOLIC BLOOD PRESSURE: 110 MMHG | WEIGHT: 130 LBS

## 2022-10-26 DIAGNOSIS — Z86.69 HISTORY OF SCLERITIS: ICD-10-CM

## 2022-10-26 DIAGNOSIS — R76.8 ELEVATED ANTINUCLEAR ANTIBODY (ANA) LEVEL: Primary | ICD-10-CM

## 2022-10-26 PROCEDURE — 99244 OFF/OP CNSLTJ NEW/EST MOD 40: CPT | Performed by: INTERNAL MEDICINE

## 2022-10-26 RX ORDER — PREDNISOLONE ACETATE 10 MG/ML
SUSPENSION/ DROPS OPHTHALMIC
COMMUNITY
Start: 2022-10-24

## 2022-10-26 NOTE — TELEPHONE ENCOUNTER
Spoke with [employer] and they require a release of records signed from patient, they will let her know

## 2022-10-30 LAB
APPEARANCE UR: CLEAR
BACTERIA UR QL AUTO: ABNORMAL /HPF
BILIRUB UR QL STRIP: NEGATIVE
C3 SERPL-MCNC: 98 MG/DL (ref 83–193)
C4 SERPL-MCNC: 30 MG/DL (ref 15–57)
CARDIOLIPIN IGA SER IA-ACNC: <2 APL-U/ML
CARDIOLIPIN IGG SER IA-ACNC: <2 GPL-U/ML
CARDIOLIPIN IGM SER IA-ACNC: <2 MPL-U/ML
CCP IGG SERPL-ACNC: <16 UNITS
CENTROMERE B AB SER-ACNC: NORMAL AI
CK SERPL-CCNC: 72 U/L (ref 29–143)
COLOR UR: YELLOW
CREAT UR-MCNC: 105 MG/DL (ref 20–275)
DSDNA AB SER-ACNC: <1 IU/ML
ENA JO1 AB SER IA-ACNC: NORMAL AI
ENA SCL70 AB SER IA-ACNC: NORMAL AI
ENA SS-A AB SER IA-ACNC: NORMAL AI
ENA SS-B AB SER IA-ACNC: NORMAL AI
FERRITIN SERPL-MCNC: 15 NG/ML (ref 16–232)
GLUCOSE UR QL STRIP: NEGATIVE
HGB UR QL STRIP: ABNORMAL
HYALINE CASTS #/AREA URNS LPF: ABNORMAL /LPF
KETONES UR QL STRIP: NEGATIVE
LA PPP-IMP: NORMAL
LEUKOCYTE ESTERASE UR QL STRIP: NEGATIVE
MYELOPEROXIDASE AB SER IA-ACNC: <1 AI
NITRITE UR QL STRIP: NEGATIVE
PH UR STRIP: 7 [PH] (ref 5–8)
PROT UR QL STRIP: NEGATIVE
PROT UR-MCNC: 10 MG/DL (ref 5–24)
PROT/CREAT UR: 0.1 MG/MG CREAT (ref 0.02–0.18)
PROT/CREAT UR: 95 MG/G CREAT (ref 24–184)
PROTEINASE3 AB SER IA-ACNC: <1 AI
RBC #/AREA URNS HPF: ABNORMAL /HPF
SCREEN APTT: 36 SEC
SCREEN DRVVT: 34 SEC
SP GR UR STRIP: 1.02 (ref 1–1.03)
SQUAMOUS #/AREA URNS HPF: ABNORMAL /HPF
THYROGLOB AB SERPL-ACNC: 14 IU/ML
THYROPEROXIDASE AB SERPL-ACNC: 444 IU/ML
WBC #/AREA URNS HPF: ABNORMAL /HPF

## 2022-10-31 ENCOUNTER — FOLLOW UP (OUTPATIENT)
Dept: URBAN - METROPOLITAN AREA CLINIC 6 | Facility: CLINIC | Age: 52
End: 2022-10-31

## 2022-10-31 DIAGNOSIS — H15.012: ICD-10-CM

## 2022-10-31 DIAGNOSIS — H25.13: ICD-10-CM

## 2022-10-31 LAB
ANA PAT SER IF-IMP: ABNORMAL
ANA SER QL IF: POSITIVE
ANA TITR SER IF: ABNORMAL TITER
APPEARANCE UR: CLEAR
B2 GLYCOPROT1 IGG SER-ACNC: <2 U/ML
B2 GLYCOPROT1 IGM SER-ACNC: <2 U/ML
BACTERIA UR QL AUTO: ABNORMAL /HPF
BILIRUB UR QL STRIP: NEGATIVE
C3 SERPL-MCNC: 98 MG/DL (ref 83–193)
C4 SERPL-MCNC: 30 MG/DL (ref 15–57)
CARDIOLIPIN IGA SER IA-ACNC: <2 APL-U/ML
CARDIOLIPIN IGG SER IA-ACNC: <2 GPL-U/ML
CARDIOLIPIN IGM SER IA-ACNC: <2 MPL-U/ML
CCP IGG SERPL-ACNC: <16 UNITS
CENTROMERE B AB SER-ACNC: NORMAL AI
CK SERPL-CCNC: 72 U/L (ref 29–143)
COLOR UR: YELLOW
CREAT UR-MCNC: 105 MG/DL (ref 20–275)
DSDNA AB SER-ACNC: <1 IU/ML
ENA JO1 AB SER IA-ACNC: NORMAL AI
ENA SCL70 AB SER IA-ACNC: NORMAL AI
ENA SS-A AB SER IA-ACNC: NORMAL AI
ENA SS-B AB SER IA-ACNC: NORMAL AI
FERRITIN SERPL-MCNC: 15 NG/ML (ref 16–232)
GLUCOSE UR QL STRIP: NEGATIVE
HGB UR QL STRIP: ABNORMAL
HYALINE CASTS #/AREA URNS LPF: ABNORMAL /LPF
KETONES UR QL STRIP: NEGATIVE
LA PPP-IMP: NORMAL
LEUKOCYTE ESTERASE UR QL STRIP: NEGATIVE
MYELOPEROXIDASE AB SER IA-ACNC: <1 AI
NITRITE UR QL STRIP: NEGATIVE
PH UR STRIP: 7 [PH] (ref 5–8)
PROT UR QL STRIP: NEGATIVE
PROT UR-MCNC: 10 MG/DL (ref 5–24)
PROT/CREAT UR: 0.1 MG/MG CREAT (ref 0.02–0.18)
PROT/CREAT UR: 95 MG/G CREAT (ref 24–184)
PROTEINASE3 AB SER IA-ACNC: <1 AI
RBC #/AREA URNS HPF: ABNORMAL /HPF
SCREEN APTT: 36 SEC
SCREEN DRVVT: 34 SEC
SP GR UR STRIP: 1.02 (ref 1–1.03)
SQUAMOUS #/AREA URNS HPF: ABNORMAL /HPF
THYROGLOB AB SERPL-ACNC: 14 IU/ML
THYROPEROXIDASE AB SERPL-ACNC: 444 IU/ML
WBC #/AREA URNS HPF: ABNORMAL /HPF

## 2022-10-31 PROCEDURE — 92012 INTRM OPH EXAM EST PATIENT: CPT

## 2022-10-31 ASSESSMENT — TONOMETRY
OS_IOP_MMHG: 16
OD_IOP_MMHG: 161

## 2022-10-31 ASSESSMENT — VISUAL ACUITY
OS_CC: (L)20/30
OD_CC: (L)20/30

## 2022-11-01 ENCOUNTER — TELEPHONE (OUTPATIENT)
Dept: FAMILY MEDICINE CLINIC | Facility: CLINIC | Age: 52
End: 2022-11-01

## 2022-11-01 NOTE — TELEPHONE ENCOUNTER
Bobby Issa saw rheumatology  They ordered some labs and told her that some of the those labs need to be followed up on by PCP  Wants to know if you could review the records  She is coming in on 11/09 and wants to know if she should be seen sooner

## 2022-11-02 ENCOUNTER — APPOINTMENT (OUTPATIENT)
Dept: LAB | Facility: CLINIC | Age: 52
End: 2022-11-02

## 2022-11-02 DIAGNOSIS — R79.89 ABNORMAL THYROID BLOOD TEST: ICD-10-CM

## 2022-11-02 DIAGNOSIS — R79.89 ABNORMAL THYROID BLOOD TEST: Primary | ICD-10-CM

## 2022-11-02 LAB
T3FREE SERPL-MCNC: 2.28 PG/ML (ref 2.3–4.2)
T4 FREE SERPL-MCNC: 0.89 NG/DL (ref 0.76–1.46)
TSH SERPL DL<=0.05 MIU/L-ACNC: 2.86 UIU/ML (ref 0.45–4.5)

## 2022-11-02 NOTE — TELEPHONE ENCOUNTER
Reviewed labs thyroid antibody is  high which could signal inflammation of thyroid will check TSH free T4 and free T3  and US of thyroid  other abn BRITANY is up to rheumatology to handle

## 2022-11-09 ENCOUNTER — OFFICE VISIT (OUTPATIENT)
Dept: FAMILY MEDICINE CLINIC | Facility: CLINIC | Age: 52
End: 2022-11-09

## 2022-11-09 VITALS
HEIGHT: 65 IN | SYSTOLIC BLOOD PRESSURE: 102 MMHG | WEIGHT: 130 LBS | TEMPERATURE: 98.1 F | OXYGEN SATURATION: 99 % | DIASTOLIC BLOOD PRESSURE: 70 MMHG | BODY MASS INDEX: 21.66 KG/M2 | RESPIRATION RATE: 16 BRPM | HEART RATE: 68 BPM

## 2022-11-09 DIAGNOSIS — R45.89 SYMPTOMS OF DEPRESSION: ICD-10-CM

## 2022-11-09 DIAGNOSIS — E06.9 THYROIDITIS: Primary | ICD-10-CM

## 2022-11-09 DIAGNOSIS — F51.01 PRIMARY INSOMNIA: ICD-10-CM

## 2022-11-09 DIAGNOSIS — Z12.31 BREAST CANCER SCREENING BY MAMMOGRAM: ICD-10-CM

## 2022-11-09 DIAGNOSIS — R76.8 ELEVATED ANTINUCLEAR ANTIBODY (ANA) LEVEL: ICD-10-CM

## 2022-11-09 DIAGNOSIS — G25.81 RESTLESS LEG SYNDROME: ICD-10-CM

## 2022-11-09 NOTE — PROGRESS NOTES
Name: Radha Mensah      : 1970      MRN: [id number]  Encounter Provider: Bruno Hathaway MD  Encounter Date: 2022   Encounter department: [address] [place] [address] MEDICINE    Assessment & Plan     1  Thyroiditis  Assessment & Plan:  Elevated antibodies  Euthyroid  Will need US thyroid     Orders:  -     US thyroid; Future; Expected date: 2022    2  Symptoms of depression  Assessment & Plan:  Pt  With mild depression  Does not want meds  at this time will think about  lexapro  since pt is reluctant would start at 5mg po qd  Pt will let me know       3  Breast cancer screening by mammogram  -     Mammo screening bilateral w 3d & cad; Future; Expected date: 2022    4  Primary insomnia  Assessment & Plan:  Pt with long history of insomnia  does not want meds      5  Restless leg syndrome  Assessment & Plan:  Pt does not want meds      6  Elevated antinuclear antibody (BRITANY) level  Assessment & Plan:  Rheumatology feels  False positive              Subjective      HPI pt went to rheumatologist   And told all ok only abnormality  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  and told she should be evalauted  Review of Systems   Constitutional: Positive for fatigue  Respiratory: Negative for cough, chest tightness and shortness of breath  Cardiovascular: Negative for chest pain, palpitations and leg swelling  Neurological: Negative for dizziness, weakness, light-headedness and headaches  Psychiatric/Behavioral: Positive for dysphoric mood (pt feels mild only ) and sleep disturbance (pt has insomnia for a long time feels she has restless leg now )  The patient is nervous/anxious (mild anxiety feels this is nl for her )          Current Outpatient Medications on File Prior to Visit   Medication Sig   • prednisoLONE acetate (PRED FORTE) 1 % ophthalmic suspension  (Patient not taking: Reported on 2022)       Objective     /70 (BP Location: Left arm, Patient Position: Sitting, Cuff Size: Standard)   Pulse 68   Temp 98 1 °F (36 7 °C) (Temporal)   Resp 16   Ht 5' 5" (1 651 m)   Wt 59 kg (130 lb)   SpO2 99%   BMI 21 63 kg/m²     Physical Exam  Constitutional:       General: She is not in acute distress  Appearance: Normal appearance  She is well-developed  She is obese  She is not ill-appearing  Eyes:      Extraocular Movements: Extraocular movements intact  Pupils: Pupils are equal, round, and reactive to light  Cardiovascular:      Rate and Rhythm: Normal rate and regular rhythm  Pulses: Normal pulses  Heart sounds: Normal heart sounds  No murmur heard  Pulmonary:      Effort: Pulmonary effort is normal       Breath sounds: Normal breath sounds  Chest:   Breasts:      Right: Normal  No inverted nipple, mass, skin change or tenderness  Left: Normal  No inverted nipple, mass, skin change or tenderness  Comments: Tenderness with periods   Musculoskeletal:      Right lower leg: No edema  Left lower leg: No edema  Neurological:      General: No focal deficit present  Mental Status: She is alert and oriented to person, place, and time  Mental status is at baseline  Psychiatric:         Mood and Affect: Mood normal          Behavior: Behavior normal          Thought Content:  Thought content normal        Leonel White MD

## 2022-11-09 NOTE — ASSESSMENT & PLAN NOTE
Pt  With mild depression  Does not want meds  at this time will think about  lexapro  since pt is reluctant would start at 5mg po qd  Pt will let me know

## 2022-11-17 ENCOUNTER — TELEPHONE (OUTPATIENT)
Dept: FAMILY MEDICINE CLINIC | Facility: CLINIC | Age: 52
End: 2022-11-17

## 2022-11-17 DIAGNOSIS — R45.89 SYMPTOMS OF DEPRESSION: Primary | ICD-10-CM

## 2022-11-17 RX ORDER — ESCITALOPRAM OXALATE 10 MG/1
10 TABLET ORAL DAILY
COMMUNITY
End: 2022-11-17 | Stop reason: SDUPTHER

## 2022-11-17 RX ORDER — ESCITALOPRAM OXALATE 10 MG/1
TABLET ORAL
Qty: 30 TABLET | Refills: 3 | Status: SHIPPED | OUTPATIENT
Start: 2022-11-17

## 2022-11-17 NOTE — TELEPHONE ENCOUNTER
Elaine said she was seen 2 weeks ago, and Lexapro was discussed (but "put on hold"), & she'd like to start this now    [facility]

## 2022-12-05 ENCOUNTER — HOSPITAL ENCOUNTER (OUTPATIENT)
Dept: RADIOLOGY | Facility: HOSPITAL | Age: 52
Discharge: HOME | End: 2022-12-05

## 2022-12-05 DIAGNOSIS — E06.9 THYROIDITIS: ICD-10-CM

## 2022-12-08 ENCOUNTER — TELEPHONE (OUTPATIENT)
Dept: FAMILY MEDICINE CLINIC | Facility: CLINIC | Age: 52
End: 2022-12-08

## 2022-12-08 DIAGNOSIS — E06.9 THYROIDITIS: Primary | ICD-10-CM

## 2022-12-19 ENCOUNTER — OFFICE VISIT (OUTPATIENT)
Dept: FAMILY MEDICINE CLINIC | Facility: CLINIC | Age: 52
End: 2022-12-19

## 2022-12-19 VITALS
HEART RATE: 75 BPM | RESPIRATION RATE: 16 BRPM | DIASTOLIC BLOOD PRESSURE: 70 MMHG | HEIGHT: 65 IN | SYSTOLIC BLOOD PRESSURE: 100 MMHG | BODY MASS INDEX: 22.16 KG/M2 | WEIGHT: 133 LBS | OXYGEN SATURATION: 96 % | TEMPERATURE: 97.3 F

## 2022-12-19 DIAGNOSIS — E06.9 THYROIDITIS: ICD-10-CM

## 2022-12-19 DIAGNOSIS — E04.1 THYROID NODULE: ICD-10-CM

## 2022-12-19 DIAGNOSIS — Z23 NEED FOR INFLUENZA VACCINATION: Primary | ICD-10-CM

## 2022-12-19 DIAGNOSIS — R45.89 SYMPTOMS OF DEPRESSION: ICD-10-CM

## 2022-12-19 RX ORDER — ESCITALOPRAM OXALATE 20 MG/1
20 TABLET ORAL DAILY
Qty: 30 TABLET | Refills: 6 | Status: SHIPPED | OUTPATIENT
Start: 2022-12-19

## 2022-12-19 NOTE — PROGRESS NOTES
Name: Shelby Dennison      : 1970      MRN: [id number]  Encounter Provider: Grace Spain MD  Encounter Date: 2022   Encounter department: [address] [place] [address] MEDICINE    Assessment & Plan     1  Need for influenza vaccination  -     FLUBLOK: influenza vaccine, quadrivalent, recombinant, PF, 0 5 mL    2  Symptoms of depression  Assessment & Plan: Will increase to 20mg po qd  And follow up 1 mo    Orders:  -     escitalopram (LEXAPRO) 20 mg tablet; Take 1 tablet (20 mg total) by mouth daily    3  Thyroid nodule  Assessment & Plan: Will repeat US 1 yr      4  Thyroiditis  Assessment & Plan:  Pt with high Antibodies but TSH is nl             Subjective      HPI pt here for  Follow up of depression on lexapro for 1 month is tolerating but doesn not feel it is helping much wants increase;  wants to review  US thyroid did have nodule   Review of Systems   Psychiatric/Behavioral: Positive for dysphoric mood  Negative for decreased concentration and suicidal ideas  The patient is not nervous/anxious  Current Outpatient Medications on File Prior to Visit   Medication Sig   • [DISCONTINUED] escitalopram (LEXAPRO) 10 mg tablet Take 5 mg daily for 2 weeks and then increase to 10 mg daily  • [DISCONTINUED] prednisoLONE acetate (PRED FORTE) 1 % ophthalmic suspension  (Patient not taking: Reported on 2022)       Objective     /70 (BP Location: Left arm, Patient Position: Sitting, Cuff Size: Standard)   Pulse 75   Temp (!) 97 3 °F (36 3 °C) (Temporal)   Resp 16   Ht 5' 5" (1 651 m)   Wt 60 3 kg (133 lb)   SpO2 96%   BMI 22 13 kg/m²     Physical Exam  Constitutional:       General: She is not in acute distress  Appearance: Normal appearance  She is well-developed  She is not ill-appearing  Eyes:      Extraocular Movements: Extraocular movements intact  Neck:      Thyroid: No thyromegaly  Cardiovascular:      Rate and Rhythm: Normal rate     Pulmonary:      Effort: Pulmonary effort is normal  No respiratory distress  Breath sounds: Normal breath sounds  Musculoskeletal:      Cervical back: Normal range of motion  Neurological:      General: No focal deficit present  Mental Status: She is alert and oriented to person, place, and time  Mental status is at baseline     Psychiatric:         Mood and Affect: Mood normal          Behavior: Behavior normal        Yanelis Chan MD

## 2023-01-06 ENCOUNTER — HOSPITAL ENCOUNTER (OUTPATIENT)
Dept: RADIOLOGY | Facility: HOSPITAL | Age: 53
Discharge: HOME | End: 2023-01-06

## 2023-01-06 VITALS — HEIGHT: 65 IN | WEIGHT: 133 LBS | BODY MASS INDEX: 22.16 KG/M2

## 2023-01-06 DIAGNOSIS — Z12.31 BREAST CANCER SCREENING BY MAMMOGRAM: ICD-10-CM

## 2023-01-16 ENCOUNTER — RA CDI HCC (OUTPATIENT)
Dept: OTHER | Facility: HOSPITAL | Age: 53
End: 2023-01-16

## 2023-01-16 NOTE — PROGRESS NOTES
[facility]  [facility]       Chart reviewed, no opportunity found: CHART REVIEWED, NO OPPORTUNITY FOUND        Patients Insurance        Commercial Insurance: [address] [place] [address]

## 2023-01-17 DIAGNOSIS — R45.89 SYMPTOMS OF DEPRESSION: ICD-10-CM

## 2023-01-17 RX ORDER — ESCITALOPRAM OXALATE 20 MG/1
20 TABLET ORAL DAILY
Qty: 30 TABLET | Refills: 0 | Status: SHIPPED | OUTPATIENT
Start: 2023-01-17 | End: 2023-01-19 | Stop reason: SDUPTHER

## 2023-02-14 ENCOUNTER — OFFICE VISIT (OUTPATIENT)
Dept: FAMILY MEDICINE CLINIC | Facility: CLINIC | Age: 53
End: 2023-02-14

## 2023-02-14 VITALS
DIASTOLIC BLOOD PRESSURE: 68 MMHG | OXYGEN SATURATION: 97 % | TEMPERATURE: 98.6 F | SYSTOLIC BLOOD PRESSURE: 100 MMHG | RESPIRATION RATE: 16 BRPM | HEART RATE: 68 BPM | BODY MASS INDEX: 22.16 KG/M2 | WEIGHT: 133 LBS | HEIGHT: 65 IN

## 2023-02-14 DIAGNOSIS — F51.01 PRIMARY INSOMNIA: ICD-10-CM

## 2023-02-14 DIAGNOSIS — R45.89 SYMPTOMS OF DEPRESSION: Primary | ICD-10-CM

## 2023-02-14 DIAGNOSIS — Z12.11 COLON CANCER SCREENING: ICD-10-CM

## 2023-02-14 RX ORDER — ESCITALOPRAM OXALATE 20 MG/1
20 TABLET ORAL DAILY
Qty: 30 TABLET | Refills: 0 | Status: SHIPPED | OUTPATIENT
Start: 2023-02-14 | End: 2023-02-21 | Stop reason: SDUPTHER

## 2023-02-14 NOTE — PROGRESS NOTES
Name: Claudeen Hammers      : 1970      MRN: [id number]  Encounter Provider: Zeenat Keene MD  Encounter Date: 2023   Encounter department: [address] [place] [address] MEDICINE    Assessment & Plan     1  Symptoms of depression  Assessment & Plan:  Pt feels well on lexapro    Orders:  -     escitalopram (LEXAPRO) 20 mg tablet; Take 1 tablet (20 mg total) by mouth daily    2  Primary insomnia  Assessment & Plan:  Doing well on lexapro      3  Colon cancer screening  -     Ambulatory referral for colonoscopy; Future           Subjective      HPI pt here for reevaluation of depression/insomnia  Much better on lexapro pt had US thyroid and due for repeat in 1 yr   Review of Systems   Psychiatric/Behavioral: Negative for dysphoric mood and sleep disturbance  The patient is not nervous/anxious  Current Outpatient Medications on File Prior to Visit   Medication Sig   • [DISCONTINUED] escitalopram (LEXAPRO) 20 mg tablet Take 1 tablet (20 mg total) by mouth daily       Objective     /68 (BP Location: Left arm, Patient Position: Sitting, Cuff Size: Standard)   Pulse 68   Temp 98 6 °F (37 °C) (Tympanic)   Resp 16   Ht 5' 5" (1 651 m)   Wt 60 3 kg (133 lb)   SpO2 97%   BMI 22 13 kg/m²     Physical Exam  Constitutional:       General: She is not in acute distress  Appearance: Normal appearance  She is well-developed  She is not ill-appearing  Eyes:      Extraocular Movements: Extraocular movements intact  Neck:      Thyroid: No thyromegaly  Cardiovascular:      Rate and Rhythm: Normal rate  Pulmonary:      Effort: Pulmonary effort is normal  No respiratory distress  Breath sounds: Normal breath sounds  Musculoskeletal:      Cervical back: Normal range of motion  Neurological:      General: No focal deficit present  Mental Status: She is alert and oriented to person, place, and time  Mental status is at baseline     Psychiatric:         Mood and Affect: Mood normal  Behavior: Behavior normal        Areli Mercer MD

## 2023-02-21 DIAGNOSIS — R45.89 SYMPTOMS OF DEPRESSION: ICD-10-CM

## 2023-02-22 RX ORDER — ESCITALOPRAM OXALATE 20 MG/1
20 TABLET ORAL DAILY
Qty: 30 TABLET | Refills: 3 | Status: SHIPPED | OUTPATIENT
Start: 2023-02-22

## 2023-03-21 ENCOUNTER — TELEPHONE (OUTPATIENT)
Dept: GASTROENTEROLOGY | Facility: CLINIC | Age: 53
End: 2023-03-21

## 2023-03-21 ENCOUNTER — PREP FOR PROCEDURE (OUTPATIENT)
Dept: GASTROENTEROLOGY | Facility: CLINIC | Age: 53
End: 2023-03-21

## 2023-03-21 DIAGNOSIS — R45.89 SYMPTOMS OF DEPRESSION: ICD-10-CM

## 2023-03-21 DIAGNOSIS — Z12.11 SCREENING FOR COLON CANCER: Primary | ICD-10-CM

## 2023-03-21 RX ORDER — ESCITALOPRAM OXALATE 20 MG/1
20 TABLET ORAL DAILY
Qty: 30 TABLET | Refills: 4 | Status: SHIPPED | OUTPATIENT
Start: 2023-03-21

## 2023-03-21 NOTE — TELEPHONE ENCOUNTER
Scheduled date of colonoscopy (as of today): 5/22/23    Physician performing colonoscopy: Chris    Location of colonoscopy: [facility]    PASSED OA   SCREENING

## 2023-05-01 ENCOUNTER — OFFICE VISIT (OUTPATIENT)
Dept: FAMILY MEDICINE CLINIC | Facility: CLINIC | Age: 53
End: 2023-05-01

## 2023-05-01 VITALS
RESPIRATION RATE: 16 BRPM | HEART RATE: 68 BPM | TEMPERATURE: 97.9 F | DIASTOLIC BLOOD PRESSURE: 66 MMHG | SYSTOLIC BLOOD PRESSURE: 90 MMHG | OXYGEN SATURATION: 98 % | WEIGHT: 132 LBS | HEIGHT: 65 IN | BODY MASS INDEX: 21.99 KG/M2

## 2023-05-01 DIAGNOSIS — R53.83 OTHER FATIGUE: ICD-10-CM

## 2023-05-01 DIAGNOSIS — Z00.00 ANNUAL PHYSICAL EXAM: ICD-10-CM

## 2023-05-01 DIAGNOSIS — E04.1 THYROID NODULE: Primary | ICD-10-CM

## 2023-05-01 DIAGNOSIS — R76.8 ELEVATED ANTINUCLEAR ANTIBODY (ANA) LEVEL: ICD-10-CM

## 2023-05-01 DIAGNOSIS — F51.01 PRIMARY INSOMNIA: ICD-10-CM

## 2023-05-01 DIAGNOSIS — E78.5 DYSLIPIDEMIA: ICD-10-CM

## 2023-05-01 DIAGNOSIS — E06.9 THYROIDITIS: ICD-10-CM

## 2023-05-01 DIAGNOSIS — G25.81 RESTLESS LEG SYNDROME: ICD-10-CM

## 2023-05-01 NOTE — PROGRESS NOTES
Name: Rachel Fish      : 1970      MRN: [id number]  Encounter Provider: Nathaly Benson MD  Encounter Date: 2023   Encounter department: [address] [place] [address] MEDICINE    Assessment & Plan     1  Thyroid nodule  Assessment & Plan:  Needs repeat thyroid Us 1 yr     Orders:  -     US thyroid; Future; Expected date: 2023    2  Thyroiditis  -     TSH, 3rd generation; Future  -     T4, free; Future  -     T3, free; Future  -     Thyroid Peroxidase and Thyroglobulin Antibodies; Future    3  Annual physical exam  Assessment & Plan:  Check routine labs      Orders:  -     CBC and differential; Future  -     Comprehensive metabolic panel; Future; Expected date: 2023  -     Lipid panel; Future; Expected date: 2023    4  Dyslipidemia  Assessment & Plan:  Recheck labs    Orders:  -     Lipid panel; Future; Expected date: 2023    5  Primary insomnia  Assessment & Plan: On lexapro doing well      6  Other fatigue  Assessment & Plan:  Possible long haul covid  Labs have been nl will recheck      7  Restless leg syndrome  Assessment & Plan:  Much better on lexapro      8  Elevated antinuclear antibody (BRITANY) level  Assessment & Plan:  Pt saw rheumatologist felt not significant but does have follow up           Subjective      HPIpt here  For physical  Pt has  depression does feel and does feel  better on lexapro no more insomnia  Still feel fatigue had covid in   And believes she may have long term covid   Review of Systems   Constitutional: Positive for fatigue  Negative for appetite change, chills and fever  Respiratory: Negative for cough, chest tightness and shortness of breath  Cardiovascular: Negative for chest pain, palpitations and leg swelling  Gastrointestinal: Negative for abdominal pain, constipation, diarrhea, nausea and vomiting  Genitourinary: Negative for difficulty urinating and frequency     Musculoskeletal: Negative for arthralgias, back pain, gait "problem and neck pain  Skin: Negative for rash  Neurological: Negative for dizziness, weakness, light-headedness, numbness and headaches  Hematological: Does not bruise/bleed easily  Psychiatric/Behavioral: Negative for dysphoric mood and sleep disturbance  The patient is not nervous/anxious  Current Outpatient Medications on File Prior to Visit   Medication Sig    escitalopram (LEXAPRO) 20 mg tablet Take 1 tablet (20 mg total) by mouth daily       Objective     BP 90/66 (BP Location: Left arm, Patient Position: Sitting, Cuff Size: Standard)   Pulse 68   Temp 97 9 °F (36 6 °C) (Tympanic)   Resp 16   Ht 5' 5\" (1 651 m)   Wt 59 9 kg (132 lb)   SpO2 98%   BMI 21 97 kg/m²     Physical Exam  Vitals reviewed  Constitutional:       General: She is not in acute distress  Appearance: Normal appearance  She is well-developed  HENT:      Head: Normocephalic  Right Ear: Tympanic membrane, ear canal and external ear normal       Left Ear: Tympanic membrane, ear canal and external ear normal       Nose: Nose normal       Mouth/Throat:      Pharynx: No oropharyngeal exudate  Eyes:      General: Lids are normal       Extraocular Movements: Extraocular movements intact  Conjunctiva/sclera: Conjunctivae normal       Pupils: Pupils are equal, round, and reactive to light  Neck:      Thyroid: No thyromegaly  Vascular: No carotid bruit  Cardiovascular:      Rate and Rhythm: Normal rate and regular rhythm  Pulses: Normal pulses  Heart sounds: Normal heart sounds  No murmur heard  No friction rub  Pulmonary:      Effort: Pulmonary effort is normal  No respiratory distress  Breath sounds: Normal breath sounds  No stridor  No wheezing or rales  Chest:   Breasts:     Breasts are symmetrical       Right: Normal  No swelling, bleeding, inverted nipple, mass, nipple discharge, skin change or tenderness        Left: Normal  No swelling, bleeding, inverted nipple, mass, " nipple discharge, skin change or tenderness  Abdominal:      General: Bowel sounds are normal  There is no distension  Palpations: Abdomen is soft  There is no mass  Tenderness: There is no abdominal tenderness  There is no guarding  Hernia: No hernia is present  Musculoskeletal:         General: Normal range of motion  Cervical back: Full passive range of motion without pain, normal range of motion and neck supple  Lymphadenopathy:      Cervical: No cervical adenopathy  Skin:     General: Skin is warm and dry  Findings: No rash  Comments: Nl appearing moles   Neurological:      General: No focal deficit present  Mental Status: She is alert and oriented to person, place, and time  Mental status is at baseline  Cranial Nerves: No cranial nerve deficit  Sensory: No sensory deficit  Motor: No abnormal muscle tone  Coordination: Coordination normal       Gait: Gait normal       Deep Tendon Reflexes: Reflexes normal  Babinski sign absent on the right side  Psychiatric:         Mood and Affect: Mood normal          Speech: Speech normal          Behavior: Behavior normal          Thought Content:  Thought content normal          Judgment: Judgment normal        Loreta Barros MD

## 2023-05-08 ENCOUNTER — ANESTHESIA EVENT (OUTPATIENT)
Dept: ANESTHESIOLOGY | Facility: HOSPITAL | Age: 53
End: 2023-05-08

## 2023-05-08 ENCOUNTER — ANESTHESIA (OUTPATIENT)
Dept: ANESTHESIOLOGY | Facility: HOSPITAL | Age: 53
End: 2023-05-08

## 2023-05-09 ENCOUNTER — APPOINTMENT (OUTPATIENT)
Dept: LAB | Facility: CLINIC | Age: 53
End: 2023-05-09

## 2023-05-09 DIAGNOSIS — E06.9 THYROIDITIS: ICD-10-CM

## 2023-05-09 DIAGNOSIS — E78.5 DYSLIPIDEMIA: ICD-10-CM

## 2023-05-09 DIAGNOSIS — Z00.00 ANNUAL PHYSICAL EXAM: ICD-10-CM

## 2023-05-09 LAB
ALBUMIN SERPL BCP-MCNC: 3.9 G/DL (ref 3.5–5)
ALP SERPL-CCNC: 57 U/L (ref 46–116)
ALT SERPL W P-5'-P-CCNC: 29 U/L (ref 12–78)
ANION GAP SERPL CALCULATED.3IONS-SCNC: 4 MMOL/L (ref 4–13)
AST SERPL W P-5'-P-CCNC: 32 U/L (ref 5–45)
BACTERIA UR QL AUTO: ABNORMAL /HPF
BASOPHILS # BLD AUTO: 0.05 THOUSANDS/ÂΜL (ref 0–0.1)
BASOPHILS NFR BLD AUTO: 1 % (ref 0–1)
BILIRUB SERPL-MCNC: 0.59 MG/DL (ref 0.2–1)
BILIRUB UR QL STRIP: NEGATIVE
BUN SERPL-MCNC: 12 MG/DL (ref 5–25)
CALCIUM SERPL-MCNC: 9.4 MG/DL (ref 8.3–10.1)
CHLORIDE SERPL-SCNC: 108 MMOL/L (ref 96–108)
CHOLEST SERPL-MCNC: 196 MG/DL
CLARITY UR: CLEAR
CO2 SERPL-SCNC: 26 MMOL/L (ref 21–32)
COLOR UR: ABNORMAL
CREAT SERPL-MCNC: 0.82 MG/DL (ref 0.6–1.3)
EOSINOPHIL # BLD AUTO: 0.14 THOUSAND/ÂΜL (ref 0–0.61)
EOSINOPHIL NFR BLD AUTO: 3 % (ref 0–6)
ERYTHROCYTE [DISTWIDTH] IN BLOOD BY AUTOMATED COUNT: 13.5 % (ref 11.6–15.1)
GFR SERPL CREATININE-BSD FRML MDRD: 83 ML/MIN/1.73SQ M
GLUCOSE P FAST SERPL-MCNC: 82 MG/DL (ref 65–99)
GLUCOSE UR STRIP-MCNC: NEGATIVE MG/DL
HCT VFR BLD AUTO: 38.3 % (ref 34.8–46.1)
HDLC SERPL-MCNC: 49 MG/DL
HGB BLD-MCNC: 12.8 G/DL (ref 11.5–15.4)
HGB UR QL STRIP.AUTO: NEGATIVE
IMM GRANULOCYTES # BLD AUTO: 0.02 THOUSAND/UL (ref 0–0.2)
IMM GRANULOCYTES NFR BLD AUTO: 0 % (ref 0–2)
KETONES UR STRIP-MCNC: NEGATIVE MG/DL
LDLC SERPL CALC-MCNC: 124 MG/DL (ref 0–100)
LEUKOCYTE ESTERASE UR QL STRIP: NEGATIVE
LYMPHOCYTES # BLD AUTO: 1.49 THOUSANDS/ÂΜL (ref 0.6–4.47)
LYMPHOCYTES NFR BLD AUTO: 27 % (ref 14–44)
MCH RBC QN AUTO: 32 PG (ref 26.8–34.3)
MCHC RBC AUTO-ENTMCNC: 33.4 G/DL (ref 31.4–37.4)
MCV RBC AUTO: 96 FL (ref 82–98)
MONOCYTES # BLD AUTO: 0.49 THOUSAND/ÂΜL (ref 0.17–1.22)
MONOCYTES NFR BLD AUTO: 9 % (ref 4–12)
MUCOUS THREADS UR QL AUTO: ABNORMAL
NEUTROPHILS # BLD AUTO: 3.36 THOUSANDS/ÂΜL (ref 1.85–7.62)
NEUTS SEG NFR BLD AUTO: 60 % (ref 43–75)
NITRITE UR QL STRIP: NEGATIVE
NON-SQ EPI CELLS URNS QL MICRO: ABNORMAL /HPF
NONHDLC SERPL-MCNC: 147 MG/DL
NRBC BLD AUTO-RTO: 0 /100 WBCS
PH UR STRIP.AUTO: 7 [PH]
PLATELET # BLD AUTO: 204 THOUSANDS/UL (ref 149–390)
PMV BLD AUTO: 12.6 FL (ref 8.9–12.7)
POTASSIUM SERPL-SCNC: 4.1 MMOL/L (ref 3.5–5.3)
PROT SERPL-MCNC: 7.4 G/DL (ref 6.4–8.4)
PROT UR STRIP-MCNC: ABNORMAL MG/DL
RBC # BLD AUTO: 4 MILLION/UL (ref 3.81–5.12)
RBC #/AREA URNS AUTO: ABNORMAL /HPF
SODIUM SERPL-SCNC: 138 MMOL/L (ref 135–147)
SP GR UR STRIP.AUTO: 1.02 (ref 1–1.03)
T3FREE SERPL-MCNC: 2.33 PG/ML (ref 2.3–4.2)
T4 FREE SERPL-MCNC: 0.89 NG/DL (ref 0.76–1.46)
TRIGL SERPL-MCNC: 116 MG/DL
TSH SERPL DL<=0.05 MIU/L-ACNC: 3.59 UIU/ML (ref 0.45–4.5)
UROBILINOGEN UR STRIP-ACNC: <2 MG/DL
WBC # BLD AUTO: 5.55 THOUSAND/UL (ref 4.31–10.16)
WBC #/AREA URNS AUTO: ABNORMAL /HPF

## 2023-05-22 ENCOUNTER — ANESTHESIA (OUTPATIENT)
Dept: GASTROENTEROLOGY | Facility: AMBULARY SURGERY CENTER | Age: 53
End: 2023-05-22

## 2023-05-22 ENCOUNTER — HOSPITAL ENCOUNTER (OUTPATIENT)
Dept: GASTROENTEROLOGY | Facility: AMBULARY SURGERY CENTER | Age: 53
Setting detail: OUTPATIENT SURGERY
Discharge: HOME | End: 2023-05-22
Attending: INTERNAL MEDICINE | Admitting: INTERNAL MEDICINE

## 2023-05-22 ENCOUNTER — ANESTHESIA EVENT (OUTPATIENT)
Dept: GASTROENTEROLOGY | Facility: AMBULARY SURGERY CENTER | Age: 53
End: 2023-05-22

## 2023-05-22 VITALS
HEIGHT: 65 IN | BODY MASS INDEX: 21.56 KG/M2 | SYSTOLIC BLOOD PRESSURE: 103 MMHG | WEIGHT: 129.4 LBS | TEMPERATURE: 97.9 F | DIASTOLIC BLOOD PRESSURE: 60 MMHG | OXYGEN SATURATION: 100 % | RESPIRATION RATE: 18 BRPM | HEART RATE: 51 BPM

## 2023-05-22 DIAGNOSIS — Z12.11 SCREENING FOR COLON CANCER: ICD-10-CM

## 2023-05-22 LAB
EXT PREGNANCY TEST URINE: NEGATIVE
EXT. CONTROL: NORMAL

## 2023-05-22 RX ORDER — PROPOFOL 10 MG/ML
INJECTION, EMULSION INTRAVENOUS AS NEEDED
Status: DISCONTINUED | OUTPATIENT
Start: 2023-05-22 | End: 2023-05-22

## 2023-05-22 RX ORDER — SODIUM CHLORIDE, SODIUM LACTATE, POTASSIUM CHLORIDE, CALCIUM CHLORIDE 600; 310; 30; 20 MG/100ML; MG/100ML; MG/100ML; MG/100ML
INJECTION, SOLUTION INTRAVENOUS CONTINUOUS PRN
Status: DISCONTINUED | OUTPATIENT
Start: 2023-05-22 | End: 2023-05-22

## 2023-05-22 RX ADMIN — PROPOFOL 100 MG: 10 INJECTION, EMULSION INTRAVENOUS at 13:00

## 2023-05-22 RX ADMIN — PROPOFOL 100 MG: 10 INJECTION, EMULSION INTRAVENOUS at 13:06

## 2023-05-22 RX ADMIN — SODIUM CHLORIDE, SODIUM LACTATE, POTASSIUM CHLORIDE, AND CALCIUM CHLORIDE: .6; .31; .03; .02 INJECTION, SOLUTION INTRAVENOUS at 13:14

## 2023-05-22 RX ADMIN — PROPOFOL 50 MG: 10 INJECTION, EMULSION INTRAVENOUS at 12:54

## 2023-05-22 RX ADMIN — PROPOFOL 100 MG: 10 INJECTION, EMULSION INTRAVENOUS at 12:52

## 2023-05-22 RX ADMIN — SODIUM CHLORIDE, SODIUM LACTATE, POTASSIUM CHLORIDE, AND CALCIUM CHLORIDE: .6; .31; .03; .02 INJECTION, SOLUTION INTRAVENOUS at 12:25

## 2023-05-22 RX ADMIN — Medication 40 MG: at 13:06

## 2023-05-22 NOTE — ANESTHESIA PREPROCEDURE EVALUATION
Procedure:  COLONOSCOPY    Relevant Problems   NEURO/PSYCH   (+) Cervicogenic headache        Physical Exam    Airway    Mallampati score: II         Dental       Cardiovascular  Cardiovascular exam normal    Pulmonary  Pulmonary exam normal     Other Findings        Anesthesia Plan  ASA Score- 2     Anesthesia Type- IV sedation with anesthesia with ASA Monitors  Additional Monitors:   Airway Plan:           Plan Factors-Exercise tolerance (METS): >4 METS  Chart reviewed  EKG reviewed  Imaging results reviewed  Existing labs reviewed  Patient summary reviewed  Induction- intravenous  Postoperative Plan-     Informed Consent- Anesthetic plan and risks discussed with patient  I personally reviewed this patient with the CRNA  Discussed and agreed on the Anesthesia Plan with the CRNA  Dean Marlow

## 2023-05-22 NOTE — H&P
History and Physical - [facility]  [facility] 48 y o  female MRN: [id number]          HPI: [facility] is a 48y o  year old female who presents for colonoscopy for screening  REVIEW OF SYSTEMS: Per the HPI, and otherwise unremarkable  Historical Information   Past Medical History:   Diagnosis Date   • Breast mass, right     benign   • Bulging lumbar disc    • Visual impairment     Scleritis     Past Surgical History:   Procedure Laterality Date   • BACK SURGERY     • BREAST BIOPSY Right     benign   •  SECTION     • DILATION AND CURETTAGE OF UTERUS WITH HYSTEROSOCPY     • MAMMO (HISTORICAL)  2016    scheduled for mammogram in december   •  GOBA?    • TONSILLECTOMY     • US GUIDED BREAST BIOPSY RIGHT COMPLETE Right 2018     Social History   Social History     Substance and Sexual Activity   Alcohol Use Yes   • Alcohol/week: 1 0 standard drink   • Types: 1 Standard drinks or equivalent per week    Comment: Occasionally     Social History     Substance and Sexual Activity   Drug Use Never     Social History     Tobacco Use   Smoking Status Never   Smokeless Tobacco Never     Family History   Problem Relation Age of Onset   • Diabetes Mother    • Hyperlipidemia Father    • Hypertension Father    • Heart disease Father    • Heart attack Father    • No Known Problems Sister    • No Known Problems Son    • No Known Problems Son    • Leukemia Maternal Grandmother         79   • Diabetes Maternal Grandmother    • Cancer Maternal Grandmother         leukemia   • Arthritis Maternal Grandmother    • Rheum arthritis Maternal Grandmother    • Diabetes Maternal Grandfather    • Diabetes Paternal Grandfather    • No Known Problems Maternal Aunt    • No Known Problems Paternal Aunt    • Breast cancer Family         unkown age       Meds/Allergies       Current Outpatient Medications:   •  escitalopram (LEXAPRO) 20 mg tablet    No Known Allergies    Objective There were no vitals taken for this visit  PHYSICAL EXAM    Gen: NAD  Head: NCAT  CV: RRR  CHEST: Clear  ABD: soft, NT/ND  EXT: no edema      ASSESSMENT/PLAN:  This is a 48y o  year old female here for colonoscopy, and she is stable and optimized for her procedure

## 2023-05-22 NOTE — ANESTHESIA POSTPROCEDURE EVALUATION
Post-Op Assessment Note    CV Status:  Stable  Pain Score: 0    Pain management: adequate     Mental Status:  Alert and awake   Hydration Status:  Euvolemic   PONV Controlled:  Controlled   Airway Patency:  Patent      Post Op Vitals Reviewed: Yes      Staff: CRNA         No notable events documented      BP   114/66   Temp  98   Pulse  64   Resp   16   SpO2   99

## 2023-05-30 NOTE — PROGRESS NOTES
Assessment and Plan:   Ms Clara Cartwright is a 61-year-old female with history significant for recurrent, bilateral scleritis/episcleritis who presents for a follow-up of a positive BRITANY 1:80 nuclear, homogeneous pattern  Ally Cardona presents today for a follow-up of a positive BRITANY that was detected due to recurrent episodes of bilateral episcleritis/scleritis that has been occurring since approximately 2021, but reassuringly there have been no flareups since our last office visit 6 months ago  To further evaluate the positive BRITANY as well as the history of episcleritis/scleritis I did complete an autoimmune work-up and this was all unremarkable  At this time I suspect the BRITANY is clinically insignificant and there is no clear rheumatic etiology for the history of episcleritis/scleritis  I requested she make me aware if this is to recur and also obtain clarification from ophthalmology in that case as to which type of ocular inflammation she is presenting with  - We also discussed the possibility of the positive BRITANY being induced by the thyroid antibodies  Her TSH has been normal but to review the positive thyroid antibodies I will place a referral to endocrinology     - Overall as there is no rheumatic diagnosis at this time I advised her to follow-up with me on an as-needed basis and inform me if there are any symptoms that require a reevaluation  Plan:  Diagnoses and all orders for this visit:    Elevated antinuclear antibody (BRITANY) level    History of scleritis    Thyroid antibody positive  -     Ambulatory Referral to Endocrinology; Future      Activities as tolerated  Continue other medications as prescribed by PCP and other specialists  RTC PRN  HPI     INITIAL VISIT NOTE (10/2022):  Ms Clara Cartwright is a 61-year-old female with history significant for recurrent, bilateral scleritis/episcleritis who presents for an evaluation of a positive BRITANY 1:80 nuclear, homogeneous pattern    She is referred by Dr Shine Anderson for a rheumatology consult  Patient reports over the past 1 5 years she started to experience recurrent complaints of redness with mild eye pain noted with eye movement  There has been no significant eye pain  No additional symptoms such as blurred vision, photophobia or eye discharge  These symptoms have affected both eyes but not at the same time  She has had a few episodes where her symptoms have resolved spontaneously  Due to the recurrent nature she established with [facility] and was diagnosed with scleritis (uncertain if it may actually be a diagnosis of episcleritis)  Her most recent flare-up was 1 month ago at which time she was started on Pred Forte eye drops which she is currently on and has a follow-up appointment on Monday  This is her second course of steroid eyedrops  The steroids help significantly  In view of the recurrent nature of her symptoms she had an autoimmune screen done which showed the positive BRITANY  A rheumatoid factor, ESR, CRP, Lyme antibody profile, QuantiFERON TB gold, RPR, HLA B27 antigen, angiotensin-converting enzyme and CBC were normal     She denies fevers, unintentional weight loss, alopecia, dry eyes, dry mouth, skin rash, psoriasis, photosensitivity, skin thickening/tightening, mouth/nose ulcers (except for mouth ulcers with known triggers), recurrent sinus disease, swollen glands, pleuritic chest pain, shortness of breath, cough, hemoptysis, inflammatory bowel disease, blood clots, miscarriages, Raynaud's, muscle weakness or joint pain/swelling/stiffness  She reports a history of rheumatoid arthritis in her maternal grandmother  5/31/2023:  Patient presents for a follow-up of a low titer positive BRITANY  I reviewed the testing done after the last visit which showed positive thyroid antibodies with a thyroid peroxidase of 444 and thyroglobulin antibody of 14  Ferritin was slightly low at 15    An BRITANY specificity, C3, C4, antiphospholipid antibody panel, CK, antineutrophilic cytoplasmic antibodies, urine analysis and anti-CCP antibody were unremarkable  She does not report any new complaints from the last visit  No flareups of episcleritis/scleritis  No new complaints to report today  The following portions of the patient's history were reviewed and updated as appropriate: allergies, current medications, past family history, past medical history, past social history, past surgical history and problem list       Review of Systems  Constitutional: Negative for weight change, fevers, chills, night sweats  Positive for fatigue  ENT/Mouth: Negative for hearing changes, ear pain, nasal congestion, sinus pain, hoarseness, sore throat, rhinorrhea, swallowing difficulty  Eyes: Negative for pain, redness, discharge, vision changes currently  Cardiovascular: Negative for chest pain, SOB, palpitations  Respiratory: Negative for cough, sputum, wheezing, dyspnea  Gastrointestinal: Negative for nausea, vomiting, diarrhea, constipation, pain, heartburn  Genitourinary: Negative for dysuria, urinary frequency, hematuria  Musculoskeletal: As per HPI  Skin: Negative for skin rash, color changes  Neuro: Negative for weakness, numbness, tingling, loss of consciousness  Psych: Negative for anxiety, depression  Heme/Lymph: Negative for easy bruising, bleeding, lymphadenopathy  Past Medical History:   Diagnosis Date   • Breast mass, right     benign   • Bulging lumbar disc    • Visual impairment     Scleritis       Past Surgical History:   Procedure Laterality Date   • BACK SURGERY     • BREAST BIOPSY Right     benign   •  SECTION     • DILATION AND CURETTAGE OF UTERUS WITH HYSTEROSOCPY     • MAMMO (HISTORICAL)  2016    scheduled for mammogram in december   •  [address]?    • TONSILLECTOMY     • US GUIDED BREAST BIOPSY RIGHT COMPLETE Right 2018       Social History     Socioeconomic History • Marital status: /Civil Union     Spouse name: Not on file   • Number of children: Not on file   • Years of education: Not on file   • Highest education level: Not on file   Occupational History   • Not on file   Tobacco Use   • Smoking status: Never   • Smokeless tobacco: Never   Vaping Use   • Vaping Use: Never used   Substance and Sexual Activity   • Alcohol use: Yes     Alcohol/week: 1 0 standard drink of alcohol     Types: 1 Standard drinks or equivalent per week     Comment: Occasionally   • Drug use: Never   • Sexual activity: Yes     Partners: Male     Birth control/protection: Male Sterilization     Comment: Partner vasectomy   Other Topics Concern   • Not on file   Social History Narrative    · Do you currently or have you served in the [employer]:   No      · Were you activated, into active duty, as a member of the [employer] or as a Reservist:   No      · 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      · Education:   Post Graduate      · Sexual orientation:   Heterosexual      · Exercise level: Moderate      · Diet:   Vegetarian      · General stress level:   Low        · Caffeine intake:    Moderate      · Seat belts used routinely:   Yes      · Sunscreen used routinely:   Yes      · Live alone or with others:   with others      · Presence of domestic violence:   No      · Do you feel safe at home:   Yes         Per abbe      Social Determinants of Health     Financial Resource Strain: Not on file   Food Insecurity: Not on file   Transportation Needs: Not on file   Physical Activity: Not on file   Stress: Not on file   Social Connections: Not on file   Intimate Partner Violence: Not on file   Housing Stability: Not on file       Family History   Problem Relation Age of Onset   • Diabetes Mother    • Hyperlipidemia Father    • Hypertension Father    • Heart disease Father    • Heart attack Father    • No Known Problems Sister    • No Known Problems Son    • No Known Problems Son    • Leukemia Maternal Grandmother         79   • Diabetes Maternal Grandmother    • Cancer Maternal Grandmother         leukemia   • Arthritis Maternal Grandmother    • Rheum arthritis Maternal Grandmother    • Diabetes Maternal Grandfather    • Diabetes Paternal Grandfather    • No Known Problems Maternal Aunt    • No Known Problems Paternal Aunt    • Breast cancer Family         unkown age       No Known Allergies      Current Outpatient Medications:   •  escitalopram (LEXAPRO) 20 mg tablet, Take 1 tablet (20 mg total) by mouth daily, Disp: 30 tablet, Rfl: 4      Objective:    Vitals:    05/31/23 0933   BP: 102/78   Pulse: 63   Weight: 59 4 kg (131 lb)       Physical Exam  General: Well appearing, well nourished, in no distress  Oriented x 3, normal mood and affect  Ambulating without difficulty  Skin: Good turgor, no rash, unusual bruising or prominent lesions  Hair: Normal texture and distribution  Nails: Normal color, no deformities  HEENT:  Head: Normocephalic, atraumatic  Eyes: Conjunctiva clear, sclera non-icteric, EOM intact  Extremities: No amputations or deformities, cyanosis, edema  Neurologic: Alert and oriented  No focal neurological deficits appreciated  Psychiatric: Normal mood and affect  SARAVANAN Antoine    Rheumatology

## 2023-05-31 ENCOUNTER — VBI (OUTPATIENT)
Dept: ADMINISTRATIVE | Facility: OTHER | Age: 53
End: 2023-05-31

## 2023-05-31 ENCOUNTER — OFFICE VISIT (OUTPATIENT)
Dept: RHEUMATOLOGY | Facility: CLINIC | Age: 53
End: 2023-05-31

## 2023-05-31 VITALS
DIASTOLIC BLOOD PRESSURE: 78 MMHG | HEART RATE: 63 BPM | SYSTOLIC BLOOD PRESSURE: 102 MMHG | WEIGHT: 131 LBS | BODY MASS INDEX: 21.8 KG/M2

## 2023-05-31 DIAGNOSIS — Z86.69 HISTORY OF SCLERITIS: ICD-10-CM

## 2023-05-31 DIAGNOSIS — R76.8 ELEVATED ANTINUCLEAR ANTIBODY (ANA) LEVEL: Primary | ICD-10-CM

## 2023-05-31 DIAGNOSIS — R76.8 THYROID ANTIBODY POSITIVE: ICD-10-CM

## 2023-06-01 ENCOUNTER — TELEPHONE (OUTPATIENT)
Dept: ENDOCRINOLOGY | Facility: CLINIC | Age: 53
End: 2023-06-01

## 2023-08-08 DIAGNOSIS — R45.89 SYMPTOMS OF DEPRESSION: ICD-10-CM

## 2023-08-08 RX ORDER — ESCITALOPRAM OXALATE 20 MG/1
20 TABLET ORAL DAILY
Qty: 30 TABLET | Refills: 4 | Status: SHIPPED | OUTPATIENT
Start: 2023-08-08

## 2023-08-30 DIAGNOSIS — R45.89 SYMPTOMS OF DEPRESSION: ICD-10-CM

## 2023-08-30 RX ORDER — ESCITALOPRAM OXALATE 20 MG/1
20 TABLET ORAL DAILY
Qty: 90 TABLET | Refills: 2 | Status: SHIPPED | OUTPATIENT
Start: 2023-08-30

## 2023-10-16 ENCOUNTER — OFFICE VISIT (OUTPATIENT)
Dept: FAMILY MEDICINE CLINIC | Facility: CLINIC | Age: 53
End: 2023-10-16
Payer: COMMERCIAL

## 2023-10-16 VITALS
DIASTOLIC BLOOD PRESSURE: 72 MMHG | SYSTOLIC BLOOD PRESSURE: 118 MMHG | HEART RATE: 68 BPM | BODY MASS INDEX: 23.32 KG/M2 | OXYGEN SATURATION: 96 % | RESPIRATION RATE: 16 BRPM | TEMPERATURE: 97.2 F | WEIGHT: 140 LBS | HEIGHT: 65 IN

## 2023-10-16 DIAGNOSIS — N63.13 MASS OF LOWER OUTER QUADRANT OF RIGHT BREAST: ICD-10-CM

## 2023-10-16 DIAGNOSIS — Z23 ENCOUNTER FOR IMMUNIZATION: Primary | ICD-10-CM

## 2023-10-16 PROCEDURE — 90471 IMMUNIZATION ADMIN: CPT | Performed by: FAMILY MEDICINE

## 2023-10-16 PROCEDURE — 99213 OFFICE O/P EST LOW 20 MIN: CPT | Performed by: FAMILY MEDICINE

## 2023-10-16 PROCEDURE — 90686 IIV4 VACC NO PRSV 0.5 ML IM: CPT | Performed by: FAMILY MEDICINE

## 2023-10-16 NOTE — PROGRESS NOTES
Name: Getachew Crews      : 1970      MRN: [id number]  Encounter Provider: Madelyn Landrum MD  Encounter Date: 10/16/2023   Encounter department: [facility]    Assessment & Plan     1. Encounter for immunization  -     influenza vaccine, quadrivalent, 0.5 mL, preservative-free, for adult and pediatric patients 6 mos+ (AFLURIA, [address], FLULAVAL, FLUZONE)    2. Mass of lower outer quadrant of right breast  Assessment & Plan:  Check diagnostic mammo and  right breast US     Orders:  -     Mammo diagnostic bilateral w cad; Future; Expected date: 10/16/2023  -     US breast right limited (diagnostic); Future; Expected date: 10/16/2023           Subjective      HPI pt noticed lump under right  breast  last week has usual fibrocystic breasts and this felt  different noticed it while applying lotion last mammo   Review of Systems   Skin:         Right breast lump       Current Outpatient Medications on File Prior to Visit   Medication Sig   • escitalopram (LEXAPRO) 20 mg tablet TAKE 1 TABLET BY MOUTH EVERY DAY       Objective     /72 (BP Location: Left arm, Patient Position: Sitting, Cuff Size: Standard)   Pulse 68   Temp (!) 97.2 °F (36.2 °C) (Tympanic)   Resp 16   Ht 5' 5" (1.651 m)   Wt 63.5 kg (140 lb)   SpO2 96%   BMI 23.30 kg/m²     Physical Exam  Chest:   Breasts:     Right: Mass present.       Comments: 1cm x 3/4 cm oval smooth lump  7: o clock position 1.5 inches for outer edge of  breast       Madelyn Landrum MD

## 2023-10-18 ENCOUNTER — TELEPHONE (OUTPATIENT)
Age: 53
End: 2023-10-18

## 2023-10-18 NOTE — TELEPHONE ENCOUNTER
Patient called to follow up as she had not received a call to schedule her diagnostic mammogram and ultrasound. Transferred patient to central scheduling to schedule.

## 2023-12-14 ENCOUNTER — HOSPITAL ENCOUNTER (OUTPATIENT)
Dept: ULTRASOUND IMAGING | Facility: CLINIC | Age: 53
Discharge: HOME | End: 2023-12-14
Payer: COMMERCIAL

## 2023-12-14 ENCOUNTER — HOSPITAL ENCOUNTER (OUTPATIENT)
Dept: MAMMOGRAPHY | Facility: CLINIC | Age: 53
Discharge: HOME | End: 2023-12-14
Payer: COMMERCIAL

## 2023-12-14 VITALS — BODY MASS INDEX: 21.66 KG/M2 | HEIGHT: 65 IN | WEIGHT: 130 LBS

## 2023-12-14 DIAGNOSIS — N63.13 MASS OF LOWER OUTER QUADRANT OF RIGHT BREAST: ICD-10-CM

## 2023-12-14 PROCEDURE — 76642 ULTRASOUND BREAST LIMITED: CPT

## 2023-12-14 PROCEDURE — G0279 TOMOSYNTHESIS, MAMMO: HCPCS

## 2023-12-14 PROCEDURE — 77066 DX MAMMO INCL CAD BI: CPT

## 2024-01-08 ENCOUNTER — HOSPITAL ENCOUNTER (OUTPATIENT)
Dept: RADIOLOGY | Facility: HOSPITAL | Age: 54
Discharge: HOME | End: 2024-01-08
Payer: COMMERCIAL

## 2024-01-08 DIAGNOSIS — E04.1 THYROID NODULE: ICD-10-CM

## 2024-01-08 PROCEDURE — 76536 US EXAM OF HEAD AND NECK: CPT

## 2024-01-10 ENCOUNTER — PROBLEM (OUTPATIENT)
Dept: URBAN - METROPOLITAN AREA CLINIC 6 | Facility: CLINIC | Age: 54
End: 2024-01-10

## 2024-01-10 DIAGNOSIS — H15.011: ICD-10-CM

## 2024-01-10 DIAGNOSIS — H25.13: ICD-10-CM

## 2024-01-10 PROCEDURE — 92012 INTRM OPH EXAM EST PATIENT: CPT

## 2024-01-10 ASSESSMENT — VISUAL ACUITY
OS_CC: 20/20
OD_CC: 20/25

## 2024-01-10 ASSESSMENT — TONOMETRY
OD_IOP_MMHG: 15
OS_IOP_MMHG: 15

## 2024-02-26 ENCOUNTER — FOLLOW UP (OUTPATIENT)
Dept: URBAN - METROPOLITAN AREA CLINIC 6 | Facility: CLINIC | Age: 54
End: 2024-02-26

## 2024-02-26 DIAGNOSIS — H15.011: ICD-10-CM

## 2024-02-26 PROCEDURE — 92012 INTRM OPH EXAM EST PATIENT: CPT

## 2024-02-26 ASSESSMENT — VISUAL ACUITY
OU_CC: J1+
OS_CC: 20/20-1
OD_CC: 20/20-1

## 2024-02-26 ASSESSMENT — TONOMETRY
OS_IOP_MMHG: 10
OD_IOP_MMHG: 12

## 2024-06-17 ENCOUNTER — HOSPITAL ENCOUNTER (OUTPATIENT)
Dept: ULTRASOUND IMAGING | Facility: CLINIC | Age: 54
Discharge: HOME | End: 2024-06-17
Payer: COMMERCIAL

## 2024-06-17 VITALS — BODY MASS INDEX: 21.66 KG/M2 | WEIGHT: 130 LBS | HEIGHT: 65 IN

## 2024-06-17 DIAGNOSIS — R92.8 ABNORMAL FINDINGS ON DIAGNOSTIC IMAGING OF BREAST: ICD-10-CM

## 2024-06-17 PROCEDURE — 76642 ULTRASOUND BREAST LIMITED: CPT

## 2024-06-25 ENCOUNTER — OFFICE VISIT (OUTPATIENT)
Dept: OBGYN CLINIC | Facility: CLINIC | Age: 54
End: 2024-06-25
Payer: COMMERCIAL

## 2024-06-25 VITALS
DIASTOLIC BLOOD PRESSURE: 62 MMHG | WEIGHT: 137 LBS | SYSTOLIC BLOOD PRESSURE: 102 MMHG | BODY MASS INDEX: 22.82 KG/M2 | HEIGHT: 65 IN

## 2024-06-25 DIAGNOSIS — Z01.419 ENCOUNTER FOR GYNECOLOGICAL EXAMINATION WITHOUT ABNORMAL FINDING: Primary | ICD-10-CM

## 2024-06-25 DIAGNOSIS — N95.0 POST-MENOPAUSE BLEEDING: ICD-10-CM

## 2024-06-25 PROCEDURE — G0145 SCR C/V CYTO,THINLAYER,RESCR: HCPCS | Performed by: OBSTETRICS & GYNECOLOGY

## 2024-06-25 PROCEDURE — G0476 HPV COMBO ASSAY CA SCREEN: HCPCS | Performed by: OBSTETRICS & GYNECOLOGY

## 2024-06-25 PROCEDURE — S0612 ANNUAL GYNECOLOGICAL EXAMINA: HCPCS | Performed by: OBSTETRICS & GYNECOLOGY

## 2024-06-25 NOTE — PROGRESS NOTES
"Temo Carbajal is a 54 y.o. female who presents for annual well woman exam.   Last period was last year had one episode of spotting lasted fro 1w    C/o hot flashes and night sweat  Depression and weight gain and f/w PCP  TRY LEXPRO for 1y then wean herself off of it   History of abnormal Pap smear: no  Family history of uterine or ovarian cancer: no    Family history of breast cancer: yes - maternal great aunt     Menstrual History:  OB History          3    Para   2    Term   2            AB   1    Living   2         SAB   1    IAB        Ectopic        Multiple        Live Births   2               No LMP recorded. Patient is perimenopausal.       The following portions of the patient's history were reviewed and updated as appropriate: allergies, current medications, past family history, past medical history, past social history, past surgical history, and problem list.    Review of Systems  Review of Systems   Constitutional:  Negative for activity change, appetite change, chills, fatigue and fever.   Respiratory:  Negative for apnea, cough, chest tightness and shortness of breath.    Cardiovascular:  Negative for chest pain, palpitations and leg swelling.   Gastrointestinal:  Negative for abdominal pain, constipation, diarrhea, nausea and vomiting.   Genitourinary:  Negative for difficulty urinating, dysuria, flank pain, frequency, hematuria and urgency.   Neurological:  Negative for dizziness, seizures, syncope, light-headedness, numbness and headaches.   Psychiatric/Behavioral:  Negative for agitation and confusion.           Objective      /62 (BP Location: Left arm, Patient Position: Sitting, Cuff Size: Adult)   Ht 5' 5\" (1.651 m)   Wt 62.1 kg (137 lb)   BMI 22.80 kg/m²     Physical Exam  OBGyn Exam     General:   alert and oriented, in no acute distress, alert, appears stated age, and cooperative   Heart: regular rate and rhythm, S1, S2 normal, no murmur, click, rub " or gallop   Lungs: clear to auscultation bilaterally   Abdomen: soft, non-tender, without masses or organomegaly   Vulva: normal   Vagina: normal mucosa, normal discharge   Cervix: no cervical motion tenderness and no lesions   Uterus: normal size   Adnexa:  Breast Exam:  normal adnexa  breasts appear normal, no suspicious masses, no skin or nipple changes or axillary nodes.  Fibrocystic breast               Assessment      @well woman@ .   55 yo female  Annual exam   ASCUS/hpv NEG  Endometrial Cell on PAP 2019  1 episode of postmenopausal bleeding  History of cervical polyp/ endometrial polyp removed  Fibrocystic breast  Plan   Pap/HPV  Diet/exercise  Calcium/vitamin-D  Pelvic ultrasound evaluate endometrial thickness  Management option for perimenopausal changes reviewed and discussed with patient  Up-to-date with colonoscopy  Up-to-date with mammogram repeated scheduled  Return to office for annual exam we will call patient with ultrasound result     All questions answered.     There are no Patient Instructions on file for this visit.

## 2024-06-28 LAB
LAB AP GYN PRIMARY INTERPRETATION: NORMAL
Lab: NORMAL

## 2024-07-01 ENCOUNTER — ESTABLISHED COMPREHENSIVE EXAM (OUTPATIENT)
Dept: URBAN - METROPOLITAN AREA CLINIC 6 | Facility: CLINIC | Age: 54
End: 2024-07-01

## 2024-07-01 DIAGNOSIS — Z01.00: ICD-10-CM

## 2024-07-01 DIAGNOSIS — H52.13: ICD-10-CM

## 2024-07-01 PROCEDURE — 92310 CONTACT LENS FITTING OU: CPT

## 2024-07-01 PROCEDURE — S0621 ROUTINE OPHTHALMOLOGICAL EXA: HCPCS

## 2024-07-01 ASSESSMENT — KERATOMETRY
OD_K2POWER_DIOPTERS: 42.50
OS_AXISANGLE_DEGREES: 180
OD_AXISANGLE2_DEGREES: 67
OD_K1POWER_DIOPTERS: 42.00
OS_AXISANGLE2_DEGREES: 90
OD_AXISANGLE_DEGREES: 157
OS_K1POWER_DIOPTERS: 42.75
OS_K2POWER_DIOPTERS: 42.75

## 2024-07-01 ASSESSMENT — VISUAL ACUITY
OS_CC: 20/30+2
OD_CC: 20/40+1

## 2024-08-02 ENCOUNTER — OFFICE VISIT (OUTPATIENT)
Dept: FAMILY MEDICINE CLINIC | Facility: CLINIC | Age: 54
End: 2024-08-02
Payer: COMMERCIAL

## 2024-08-02 VITALS
SYSTOLIC BLOOD PRESSURE: 110 MMHG | DIASTOLIC BLOOD PRESSURE: 70 MMHG | WEIGHT: 133 LBS | OXYGEN SATURATION: 98 % | HEART RATE: 72 BPM | HEIGHT: 65 IN | BODY MASS INDEX: 22.16 KG/M2 | RESPIRATION RATE: 16 BRPM

## 2024-08-02 DIAGNOSIS — Z00.00 ANNUAL PHYSICAL EXAM: ICD-10-CM

## 2024-08-02 DIAGNOSIS — F51.01 PRIMARY INSOMNIA: ICD-10-CM

## 2024-08-02 DIAGNOSIS — G25.81 RESTLESS LEG SYNDROME: ICD-10-CM

## 2024-08-02 DIAGNOSIS — E04.1 THYROID NODULE: ICD-10-CM

## 2024-08-02 DIAGNOSIS — H15.003 BILATERAL SCLERITIS: ICD-10-CM

## 2024-08-02 DIAGNOSIS — N95.1 MENOPAUSAL SYNDROME (HOT FLASHES): ICD-10-CM

## 2024-08-02 DIAGNOSIS — E78.5 DYSLIPIDEMIA: Primary | ICD-10-CM

## 2024-08-02 DIAGNOSIS — L98.9 SKIN LESION: ICD-10-CM

## 2024-08-02 DIAGNOSIS — R76.8 ELEVATED ANTINUCLEAR ANTIBODY (ANA) LEVEL: ICD-10-CM

## 2024-08-02 PROBLEM — R45.89 SYMPTOMS OF DEPRESSION: Status: RESOLVED | Noted: 2022-11-09 | Resolved: 2024-08-02

## 2024-08-02 PROCEDURE — 99214 OFFICE O/P EST MOD 30 MIN: CPT | Performed by: FAMILY MEDICINE

## 2024-08-02 PROCEDURE — 99396 PREV VISIT EST AGE 40-64: CPT | Performed by: FAMILY MEDICINE

## 2024-08-02 NOTE — PROGRESS NOTES
Ambulatory Visit  Name: Rosalva Carbajal      : 1970      MRN: [id number]  Encounter Provider: Nini Pena MD  Encounter Date: 2024   Encounter department: [facility] MEDICINE    Assessment & Plan   1. Dyslipidemia  Assessment & Plan:  Due for recheck   Orders:  -     Lipid panel; Future; Expected date: 2024  2. Annual physical exam  Assessment & Plan:  Check routine labs    Orders:  -     CBC and differential; Future  -     Comprehensive metabolic panel; Future; Expected date: 2024  -     Lipid panel; Future; Expected date: 2024  -     TSH, 3rd generation; Future  -     Urinalysis with microscopic  3. Primary insomnia  Assessment & Plan:  Doesn't want meds   4. Thyroid nodule  Assessment & Plan:  Due for repeat  Us   Orders:  -     TSH, 3rd generation; Future  -     Anti-microsomal antibody; Future  -     Anti-thyroglobulin antibody; Future  5. Elevated antinuclear antibody (BRITANY) level  Assessment & Plan:  Saw rheumatologist 2 yrs ago   Orders:  -     Ambulatory Referral to Rheumatology; Future  6. Bilateral scleritis  Assessment & Plan:  Followed by eye dr and will follow with rheumatology  7. Restless leg syndrome  Assessment & Plan:  Mild can try otc magnesium  8. Skin lesion  Assessment & Plan:  To derm for eval lesion nose and thigh  Orders:  -     Ambulatory Referral to Dermatology; Future  9. Menopausal syndrome (hot flashes)  Assessment & Plan:  Declines meds        History of Present Illness     Pt here for annual physical and several concerns        Review of Systems   Constitutional:  Negative for appetite change, chills, fatigue and fever.   Respiratory:  Negative for cough, chest tightness and shortness of breath.    Cardiovascular:  Negative for chest pain, palpitations and leg swelling.   Gastrointestinal:  Negative for abdominal pain, constipation, diarrhea, nausea and vomiting.   Endocrine:        Hot flashes    Genitourinary:  Negative for  "difficulty urinating and frequency.   Musculoskeletal:  Negative for arthralgias, back pain, gait problem and neck pain.   Skin:  Negative for rash.        2 spots 1 leg and 1 face    Neurological:  Negative for dizziness, weakness, light-headedness, numbness and headaches.   Hematological:  Does not bruise/bleed easily.   Psychiatric/Behavioral:  Negative for dysphoric mood and sleep disturbance. The patient is not nervous/anxious.        Objective     /70 (BP Location: Left arm, Patient Position: Sitting, Cuff Size: Standard)   Pulse 72   Resp 16   Ht 5' 5\" (1.651 m)   Wt 60.3 kg (133 lb)   SpO2 98%   BMI 22.13 kg/m²     Physical Exam  Vitals and nursing note reviewed.   Constitutional:       General: She is not in acute distress.     Appearance: Normal appearance. She is well-developed and normal weight.   HENT:      Head: Normocephalic and atraumatic.      Right Ear: Tympanic membrane, ear canal and external ear normal.      Left Ear: Tympanic membrane, ear canal and external ear normal.      Nose: Nose normal.      Mouth/Throat:      Mouth: Mucous membranes are moist.      Pharynx: Oropharynx is clear. No oropharyngeal exudate or posterior oropharyngeal erythema.   Eyes:      Extraocular Movements: Extraocular movements intact.      Conjunctiva/sclera: Conjunctivae normal.      Pupils: Pupils are equal, round, and reactive to light.   Cardiovascular:      Rate and Rhythm: Normal rate and regular rhythm.      Heart sounds: No murmur heard.  Pulmonary:      Effort: Pulmonary effort is normal. No respiratory distress.      Breath sounds: Normal breath sounds. No wheezing or rales.   Abdominal:      General: Bowel sounds are normal. There is no distension.      Palpations: Abdomen is soft. There is no mass.      Tenderness: There is no abdominal tenderness. There is no right CVA tenderness, left CVA tenderness, guarding or rebound.      Hernia: No hernia is present.   Musculoskeletal:         General: " No swelling or tenderness. Normal range of motion.      Cervical back: Normal range of motion and neck supple.   Skin:     General: Skin is warm and dry.      Capillary Refill: Capillary refill takes less than 2 seconds.      Findings: No rash.      Comments: New crusty brown macule nose ; brown macule right lateral lower thigh    Neurological:      General: No focal deficit present.      Mental Status: She is alert and oriented to person, place, and time.      Cranial Nerves: No cranial nerve deficit.      Sensory: No sensory deficit.      Motor: No weakness.      Coordination: Coordination normal.      Gait: Gait normal.      Deep Tendon Reflexes: Reflexes normal.   Psychiatric:         Mood and Affect: Mood normal.         Behavior: Behavior normal.       Administrative Statements

## 2024-08-05 ENCOUNTER — APPOINTMENT (OUTPATIENT)
Dept: LAB | Facility: CLINIC | Age: 54
End: 2024-08-05
Payer: COMMERCIAL

## 2024-08-05 DIAGNOSIS — E78.5 DYSLIPIDEMIA: ICD-10-CM

## 2024-08-05 DIAGNOSIS — Z00.00 ANNUAL PHYSICAL EXAM: ICD-10-CM

## 2024-08-05 DIAGNOSIS — E04.1 THYROID NODULE: ICD-10-CM

## 2024-08-05 LAB
ALBUMIN SERPL BCG-MCNC: 3.9 G/DL (ref 3.5–5)
ALP SERPL-CCNC: 54 U/L (ref 34–104)
ALT SERPL W P-5'-P-CCNC: 12 U/L (ref 7–52)
ANION GAP SERPL CALCULATED.3IONS-SCNC: 9 MMOL/L (ref 4–13)
AST SERPL W P-5'-P-CCNC: 22 U/L (ref 13–39)
BACTERIA UR QL AUTO: ABNORMAL /HPF
BASOPHILS # BLD AUTO: 0.04 THOUSANDS/ÂΜL (ref 0–0.1)
BASOPHILS NFR BLD AUTO: 1 % (ref 0–1)
BILIRUB SERPL-MCNC: 0.47 MG/DL (ref 0.2–1)
BILIRUB UR QL STRIP: NEGATIVE
BUN SERPL-MCNC: 9 MG/DL (ref 5–25)
CALCIUM SERPL-MCNC: 9.1 MG/DL (ref 8.4–10.2)
CHLORIDE SERPL-SCNC: 107 MMOL/L (ref 96–108)
CHOLEST SERPL-MCNC: 185 MG/DL
CLARITY UR: CLEAR
CO2 SERPL-SCNC: 27 MMOL/L (ref 21–32)
COLOR UR: ABNORMAL
CREAT SERPL-MCNC: 0.8 MG/DL (ref 0.6–1.3)
EOSINOPHIL # BLD AUTO: 0.31 THOUSAND/ÂΜL (ref 0–0.61)
EOSINOPHIL NFR BLD AUTO: 5 % (ref 0–6)
ERYTHROCYTE [DISTWIDTH] IN BLOOD BY AUTOMATED COUNT: 13.9 % (ref 11.6–15.1)
GFR SERPL CREATININE-BSD FRML MDRD: 85 ML/MIN/1.73SQ M
GLUCOSE P FAST SERPL-MCNC: 78 MG/DL (ref 65–99)
GLUCOSE UR STRIP-MCNC: NEGATIVE MG/DL
HCT VFR BLD AUTO: 36.8 % (ref 34.8–46.1)
HDLC SERPL-MCNC: 44 MG/DL
HGB BLD-MCNC: 12.1 G/DL (ref 11.5–15.4)
HGB UR QL STRIP.AUTO: NEGATIVE
IMM GRANULOCYTES # BLD AUTO: 0.01 THOUSAND/UL (ref 0–0.2)
IMM GRANULOCYTES NFR BLD AUTO: 0 % (ref 0–2)
KETONES UR STRIP-MCNC: NEGATIVE MG/DL
LDLC SERPL CALC-MCNC: 115 MG/DL (ref 0–100)
LEUKOCYTE ESTERASE UR QL STRIP: ABNORMAL
LYMPHOCYTES # BLD AUTO: 2.51 THOUSANDS/ÂΜL (ref 0.6–4.47)
LYMPHOCYTES NFR BLD AUTO: 40 % (ref 14–44)
MCH RBC QN AUTO: 31.4 PG (ref 26.8–34.3)
MCHC RBC AUTO-ENTMCNC: 32.9 G/DL (ref 31.4–37.4)
MCV RBC AUTO: 96 FL (ref 82–98)
MONOCYTES # BLD AUTO: 0.57 THOUSAND/ÂΜL (ref 0.17–1.22)
MONOCYTES NFR BLD AUTO: 9 % (ref 4–12)
MUCOUS THREADS UR QL AUTO: ABNORMAL
NEUTROPHILS # BLD AUTO: 2.78 THOUSANDS/ÂΜL (ref 1.85–7.62)
NEUTS SEG NFR BLD AUTO: 45 % (ref 43–75)
NITRITE UR QL STRIP: NEGATIVE
NON-SQ EPI CELLS URNS QL MICRO: ABNORMAL /HPF
NONHDLC SERPL-MCNC: 141 MG/DL
NRBC BLD AUTO-RTO: 0 /100 WBCS
PH UR STRIP.AUTO: 6 [PH]
PLATELET # BLD AUTO: 166 THOUSANDS/UL (ref 149–390)
PMV BLD AUTO: 12.8 FL (ref 8.9–12.7)
POTASSIUM SERPL-SCNC: 4.1 MMOL/L (ref 3.5–5.3)
PROT SERPL-MCNC: 6.7 G/DL (ref 6.4–8.4)
PROT UR STRIP-MCNC: ABNORMAL MG/DL
RBC # BLD AUTO: 3.85 MILLION/UL (ref 3.81–5.12)
RBC #/AREA URNS AUTO: ABNORMAL /HPF
SODIUM SERPL-SCNC: 143 MMOL/L (ref 135–147)
SP GR UR STRIP.AUTO: 1.01 (ref 1–1.03)
TRIGL SERPL-MCNC: 131 MG/DL
TSH SERPL DL<=0.05 MIU/L-ACNC: 6.55 UIU/ML (ref 0.45–4.5)
UROBILINOGEN UR STRIP-ACNC: <2 MG/DL
WBC # BLD AUTO: 6.22 THOUSAND/UL (ref 4.31–10.16)
WBC #/AREA URNS AUTO: ABNORMAL /HPF

## 2024-08-05 PROCEDURE — 84443 ASSAY THYROID STIM HORMONE: CPT

## 2024-08-05 PROCEDURE — 80053 COMPREHEN METABOLIC PANEL: CPT

## 2024-08-05 PROCEDURE — 86800 THYROGLOBULIN ANTIBODY: CPT

## 2024-08-05 PROCEDURE — 80061 LIPID PANEL: CPT

## 2024-08-05 PROCEDURE — 86376 MICROSOMAL ANTIBODY EACH: CPT

## 2024-08-05 PROCEDURE — 81001 URINALYSIS AUTO W/SCOPE: CPT | Performed by: FAMILY MEDICINE

## 2024-08-05 PROCEDURE — 36415 COLL VENOUS BLD VENIPUNCTURE: CPT

## 2024-08-05 PROCEDURE — 85025 COMPLETE CBC W/AUTO DIFF WBC: CPT

## 2024-08-06 ENCOUNTER — TELEPHONE (OUTPATIENT)
Dept: FAMILY MEDICINE CLINIC | Facility: CLINIC | Age: 54
End: 2024-08-06

## 2024-08-06 LAB
THYROGLOB AB SERPL-ACNC: 7.2 IU/ML (ref 0–0.9)
THYROPEROXIDASE AB SERPL-ACNC: 410 IU/ML (ref 0–34)

## 2024-08-06 NOTE — TELEPHONE ENCOUNTER
----- Message from Nini Pena MD sent at 8/5/2024  1:39 PM EDT -----  Patient is hypothyroid recommend starting levothyroxine 25 µg daily and repeat TSH at six weeks

## 2024-08-07 NOTE — TELEPHONE ENCOUNTER
Patient returned call, gave results, would like a call from Dr Pena to discuss before starting medication.

## 2024-08-21 DIAGNOSIS — E06.9 THYROIDITIS: Primary | ICD-10-CM

## 2024-08-21 RX ORDER — LEVOTHYROXINE SODIUM 25 UG/1
25 TABLET ORAL
Qty: 90 TABLET | Refills: 0 | Status: SHIPPED | OUTPATIENT
Start: 2024-08-21

## 2024-09-12 DIAGNOSIS — E06.9 THYROIDITIS: ICD-10-CM

## 2024-09-12 RX ORDER — LEVOTHYROXINE SODIUM 25 UG/1
25 TABLET ORAL
Qty: 90 TABLET | Refills: 1 | Status: SHIPPED | OUTPATIENT
Start: 2024-09-12

## 2024-10-10 DIAGNOSIS — E06.9 THYROIDITIS: ICD-10-CM

## 2024-10-10 RX ORDER — LEVOTHYROXINE SODIUM 25 UG/1
25 TABLET ORAL
Qty: 90 TABLET | Refills: 1 | Status: SHIPPED | OUTPATIENT
Start: 2024-10-10

## 2024-11-06 ENCOUNTER — PATIENT MESSAGE (OUTPATIENT)
Dept: FAMILY MEDICINE CLINIC | Facility: CLINIC | Age: 54
End: 2024-11-06

## 2024-11-06 DIAGNOSIS — E04.1 THYROID NODULE: Primary | ICD-10-CM

## 2024-11-11 ENCOUNTER — APPOINTMENT (OUTPATIENT)
Dept: LAB | Facility: CLINIC | Age: 54
End: 2024-11-11
Payer: COMMERCIAL

## 2024-11-11 DIAGNOSIS — E06.9 THYROIDITIS: ICD-10-CM

## 2024-11-11 LAB — TSH SERPL DL<=0.05 MIU/L-ACNC: 2.59 UIU/ML (ref 0.45–4.5)

## 2024-11-11 PROCEDURE — 36415 COLL VENOUS BLD VENIPUNCTURE: CPT

## 2024-11-11 PROCEDURE — 84443 ASSAY THYROID STIM HORMONE: CPT

## 2024-12-16 ENCOUNTER — HOSPITAL ENCOUNTER (OUTPATIENT)
Dept: MAMMOGRAPHY | Facility: CLINIC | Age: 54
Discharge: HOME | End: 2024-12-16
Payer: COMMERCIAL

## 2024-12-16 ENCOUNTER — HOSPITAL ENCOUNTER (OUTPATIENT)
Dept: ULTRASOUND IMAGING | Facility: CLINIC | Age: 54
Discharge: HOME | End: 2024-12-16
Payer: COMMERCIAL

## 2024-12-16 VITALS — HEIGHT: 65 IN | BODY MASS INDEX: 22.16 KG/M2 | WEIGHT: 133 LBS

## 2024-12-16 PROCEDURE — 77066 DX MAMMO INCL CAD BI: CPT

## 2024-12-16 PROCEDURE — G0279 TOMOSYNTHESIS, MAMMO: HCPCS

## 2024-12-16 PROCEDURE — 76642 ULTRASOUND BREAST LIMITED: CPT

## 2025-01-12 PROBLEM — Z86.69 HISTORY OF SCLERITIS: Status: ACTIVE | Noted: 2025-01-12

## 2025-01-12 PROBLEM — E06.3 HYPOTHYROIDISM DUE TO HASHIMOTO THYROIDITIS: Status: ACTIVE | Noted: 2025-01-12

## 2025-01-13 ENCOUNTER — OFFICE VISIT (OUTPATIENT)
Dept: RHEUMATOLOGY | Facility: CLINIC | Age: 55
End: 2025-01-13

## 2025-01-13 VITALS
OXYGEN SATURATION: 99 % | WEIGHT: 136 LBS | SYSTOLIC BLOOD PRESSURE: 108 MMHG | DIASTOLIC BLOOD PRESSURE: 66 MMHG | HEIGHT: 65 IN | BODY MASS INDEX: 22.66 KG/M2 | HEART RATE: 74 BPM

## 2025-01-13 DIAGNOSIS — Z86.69 HISTORY OF SCLERITIS: ICD-10-CM

## 2025-01-13 DIAGNOSIS — R76.8 ELEVATED ANTINUCLEAR ANTIBODY (ANA) LEVEL: Primary | ICD-10-CM

## 2025-01-13 DIAGNOSIS — E06.3 HYPOTHYROIDISM DUE TO HASHIMOTO THYROIDITIS: ICD-10-CM

## 2025-01-13 NOTE — ASSESSMENT & PLAN NOTE
Pt has hx of scleritis/episcleritis that she gets 2-3 episodes per year and uses steroid eye drops for. She is regularly following eye specialists and so far no one has told her anything about worsening of the inflammation or alternative diagnoses. She denies any other systemic complaints at this time. She has been started on levothyroxine for Hashimoto's disease.  Plan:  Discussed that if her symptoms become more frequent, or worsen and/or if Ophthalmology feels it is time to start immunosuppression she can always follow up with us at that time  RTC PRN  Orders:    Ambulatory Referral to Rheumatology

## 2025-01-13 NOTE — PROGRESS NOTES
Name: Rosalva Carbajal      : 1970      MRN: [id number]  Encounter Provider: Ludivina Galvez MD  Encounter Date: 2025   Encounter department: [facility]  :  Assessment & Plan  Elevated antinuclear antibody (BRITANY) level  Pt has hx of scleritis/episcleritis that she gets 2-3 episodes per year and uses steroid eye drops for. She is regularly following eye specialists and so far no one has told her anything about worsening of the inflammation or alternative diagnoses. She denies any other systemic complaints at this time. She has been started on levothyroxine for Hashimoto's disease.  Plan:  Discussed that if her symptoms become more frequent, or worsen and/or if Ophthalmology feels it is time to start immunosuppression she can always follow up with us at that time  RTC PRN  Orders:    Ambulatory Referral to Rheumatology    History of scleritis         Hypothyroidism due to Hashimoto thyroiditis             History of Present Illness   HPI  Rosalva Carbajal is a 54 y.o. female with history significant for recurrent, bilateral scleritis/episcleritis who presents for a follow-up of a positive BRITANY 1:80 nuclear, homogeneous pattern.       INITIAL VISIT NOTE (10/2022):  Ms. Carbajal is a 52-year-old female with history significant for recurrent, bilateral scleritis/episcleritis who presents for an evaluation of a positive BRITANY 1:80 nuclear, homogeneous pattern.  She is referred by Dr. Pena for a rheumatology consult.     Patient reports over the past 1.5 years she started to experience recurrent complaints of redness with mild eye pain noted with eye movement.  There has been no significant eye pain.  No additional symptoms such as blurred vision, photophobia or eye discharge.  These symptoms have affected both eyes but not at the same time.  She has had a few episodes where her symptoms have resolved spontaneously.  Due to the recurrent nature she established with [facility] and was diagnosed with scleritis (uncertain if it may actually be a diagnosis of episcleritis).  Her most recent flare-up was 1 month ago at which time she was started on Pred Forte eye drops which she is currently on and has a follow-up appointment on Monday.  This is her second course of steroid eyedrops.  The steroids help significantly.  In view of the recurrent nature of her symptoms she had an autoimmune screen done which showed the positive BRITANY.  A rheumatoid factor, ESR, CRP, Lyme antibody profile, QuantiFERON TB gold, RPR, HLA B27 antigen, angiotensin-converting enzyme and CBC were normal.     She denies fevers, unintentional weight loss, alopecia, dry eyes, dry mouth, skin rash, psoriasis, photosensitivity, skin thickening/tightening, mouth/nose ulcers (except for mouth ulcers with known triggers), recurrent sinus disease, swollen glands, pleuritic chest pain, shortness of breath, cough, hemoptysis, inflammatory bowel disease, blood clots, miscarriages, Raynaud's, muscle weakness or joint pain/swelling/stiffness.  She reports a history of rheumatoid arthritis in her maternal grandmother.        5/31/2023:  Patient presents for a follow-up of a low titer positive BRITANY.  I reviewed the testing done after the last visit which showed positive thyroid antibodies with a thyroid peroxidase of 444 and thyroglobulin antibody of 14.  Ferritin was slightly low at 15.  An BRITANY specificity, C3, C4, antiphospholipid antibody panel, CK, antineutrophilic cytoplasmic antibodies, urine analysis and anti-CCP antibody were unremarkable.    1/13/25:  She still has been having episodes of episcleritis/scleritis a few times a year, and she takes steroid eye drops during those episodes which has been effective. No other arthralgias, myalgias, etc.     Labs from August 2024 showed elevated TSH in the setting of + thyroid antibodies. She was started on levothyroxine and since then she has been better.     She has thyroid  "US coming up for follow up.     She follows with Eye specialist every few months.         Review of Systems   Constitutional: Negative.    HENT: Negative.     Eyes:  Positive for pain and redness.   Respiratory: Negative.     Cardiovascular: Negative.    Gastrointestinal: Negative.    Musculoskeletal: Negative.    Skin: Negative.           Objective   /66   Pulse 74   Ht 5' 5\" (1.651 m)   Wt 61.7 kg (136 lb)   SpO2 99%   BMI 22.63 kg/m²      Physical Exam  Constitutional:       Appearance: Normal appearance.   HENT:      Head: Normocephalic.   Eyes:      Extraocular Movements: Extraocular movements intact.      Pupils: Pupils are equal, round, and reactive to light.   Cardiovascular:      Pulses: Normal pulses.      Heart sounds: Normal heart sounds.   Pulmonary:      Effort: Pulmonary effort is normal.      Breath sounds: Normal breath sounds.   Musculoskeletal:      Comments: MSK Exam  The following areas have been examined today and do not show any signs of synovitis, tenderness, or restricted ROM unless otherwise specified below:  Neck  Shoulders  Back  Elbows  Wrists  Hands  Hips  Knees  Ankles  Feet    Neurological:      Mental Status: She is alert and oriented to person, place, and time.           "

## 2025-01-20 ENCOUNTER — HOSPITAL ENCOUNTER (OUTPATIENT)
Dept: RADIOLOGY | Facility: HOSPITAL | Age: 55
Discharge: HOME | End: 2025-01-20
Payer: COMMERCIAL

## 2025-01-20 DIAGNOSIS — E04.1 THYROID NODULE: ICD-10-CM

## 2025-01-20 PROCEDURE — 76536 US EXAM OF HEAD AND NECK: CPT

## 2025-01-22 ENCOUNTER — RESULTS FOLLOW-UP (OUTPATIENT)
Dept: FAMILY MEDICINE CLINIC | Facility: CLINIC | Age: 55
End: 2025-01-22

## 2025-01-22 DIAGNOSIS — E04.1 THYROID NODULE: Primary | ICD-10-CM

## 2025-04-05 DIAGNOSIS — E06.9 THYROIDITIS: ICD-10-CM

## 2025-04-07 RX ORDER — LEVOTHYROXINE SODIUM 25 UG/1
25 TABLET ORAL
Qty: 30 TABLET | Refills: 5 | Status: SHIPPED | OUTPATIENT
Start: 2025-04-07

## 2025-07-23 ENCOUNTER — RA CDI HCC (OUTPATIENT)
Dept: OTHER | Facility: HOSPITAL | Age: 55
End: 2025-07-23

## 2025-07-29 PROBLEM — N63.13 MASS OF LOWER OUTER QUADRANT OF RIGHT BREAST: Status: RESOLVED | Noted: 2023-10-16 | Resolved: 2025-07-29

## (undated) RX ORDER — PREDNISOLONE ACETATE 10 MG/ML
1 SUSPENSION/ DROPS OPHTHALMIC
Start: 2022-10-03

## (undated) RX ORDER — PREDNISOLONE ACETATE 10 MG/ML
1 SUSPENSION/ DROPS OPHTHALMIC
Start: 2024-01-10